# Patient Record
Sex: MALE | Race: WHITE | NOT HISPANIC OR LATINO | Employment: FULL TIME | URBAN - METROPOLITAN AREA
[De-identification: names, ages, dates, MRNs, and addresses within clinical notes are randomized per-mention and may not be internally consistent; named-entity substitution may affect disease eponyms.]

---

## 2017-03-31 ENCOUNTER — OFFICE VISIT (OUTPATIENT)
Dept: MEDICAL GROUP | Facility: PHYSICIAN GROUP | Age: 52
End: 2017-03-31
Payer: COMMERCIAL

## 2017-03-31 VITALS
OXYGEN SATURATION: 94 % | WEIGHT: 289 LBS | RESPIRATION RATE: 16 BRPM | DIASTOLIC BLOOD PRESSURE: 78 MMHG | TEMPERATURE: 96.8 F | HEART RATE: 77 BPM | HEIGHT: 76 IN | BODY MASS INDEX: 35.19 KG/M2 | SYSTOLIC BLOOD PRESSURE: 110 MMHG

## 2017-03-31 DIAGNOSIS — E66.9 OBESITY (BMI 30-39.9): ICD-10-CM

## 2017-03-31 DIAGNOSIS — K42.9 UMBILICAL HERNIA WITHOUT OBSTRUCTION AND WITHOUT GANGRENE: ICD-10-CM

## 2017-03-31 DIAGNOSIS — Z76.89 ENCOUNTER TO ESTABLISH CARE: ICD-10-CM

## 2017-03-31 PROBLEM — E78.5 DYSLIPIDEMIA: Status: ACTIVE | Noted: 2017-03-31

## 2017-03-31 PROCEDURE — 99213 OFFICE O/P EST LOW 20 MIN: CPT | Performed by: NURSE PRACTITIONER

## 2017-03-31 NOTE — ASSESSMENT & PLAN NOTE
Established problem that persists. He has lack of motivation and has poor diet frequently. 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

## 2017-03-31 NOTE — PROGRESS NOTES
"Subjective:     Chief Complaint   Patient presents with   • New Patient     umbilical hernia       HPI  Chris Barr is a 52 y.o. male here today to establish care and discuss umbilical hernia. Believes the umbilical hernia started about 1.5 years ago. It is infrequently bothering him. He does a lot of heavy lifting at work and does not notice symptoms during this time, but feels discomfort on the weekends when he is at home relaxing. He believes this may be because he is distracted while at work. He has been no change in bowel habits or blood in stool. No N/V. No treatments tried. He has history of inguinal hernia with repair. Requesting referral to see Dr. Campos for surgical consultation.    Obesity  Established problem that persists. He has lack of motivation and has poor diet frequently. 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     Diagnoses of Encounter to establish care, Umbilical hernia without obstruction and without gangrene, and Obesity (BMI 30-39.9) were pertinent to this visit.    Allergies: Review of patient's allergies indicates no known allergies.  Current medicines (including changes today)  Current Outpatient Prescriptions   Medication Sig Dispense Refill   • omeprazole (PRILOSEC) 20 MG delayed-release capsule Take 1 Cap by mouth every day. 30 Cap 0     No current facility-administered medications for this visit.       He  has a past medical history of Colon polyps (2001, 9/09, 10/12); Spinal stenosis; Arthritis; Anxiety (9/19/2013); Neck pain (6/26/2015); and Dyslipidemia (3/31/2017). He also has no past medical history of Diabetes (CMS-[facility]) or Hypertension.    Health Maintenance: UTD    ROS  As stated in HPI      Objective:     Blood pressure 110/78, pulse 77, temperature 36 °C (96.8 °F), resp. rate 16, height 1.93 m (6' 4\"), weight 131.09 kg (289 lb), SpO2 94 %. Body mass index is 35.19 kg/(m^2).  Physical " Exam:  General: Alert, oriented, in no acute distress.  Eye contact is good, speech goal directed, affect calm  Abdomen: Soft, ND, non-tender. No hepatosplenomegaly. Umbilical hernia present, reproducible. Bowel sounds active.   Ext: no edema        Assessment and Plan:   The following treatment plan was discussed  1. Encounter to establish care     2. Umbilical hernia without obstruction and without gangrene  Recommended abd binder for comfort until surgical eval  ER warning signs discussed for strangulation/obstructed hernia   REFERRAL TO GENERAL SURGERY   3. Obesity (BMI 30-39.9)  Patient identified as having weight management issue.  Appropriate orders and counseling given.       Followup: Return in about 6 months (around 9/30/2017) for Annual. sooner should new symptoms or problems arise.

## 2017-06-19 ENCOUNTER — OFFICE VISIT (OUTPATIENT)
Dept: URGENT CARE | Facility: PHYSICIAN GROUP | Age: 52
End: 2017-06-19
Payer: COMMERCIAL

## 2017-06-19 VITALS
WEIGHT: 271 LBS | HEART RATE: 86 BPM | DIASTOLIC BLOOD PRESSURE: 80 MMHG | TEMPERATURE: 97.3 F | SYSTOLIC BLOOD PRESSURE: 110 MMHG | RESPIRATION RATE: 14 BRPM | BODY MASS INDEX: 33 KG/M2 | OXYGEN SATURATION: 96 %

## 2017-06-19 DIAGNOSIS — R11.0 NAUSEA: ICD-10-CM

## 2017-06-19 DIAGNOSIS — R82.90 CLOUDY URINE: ICD-10-CM

## 2017-06-19 DIAGNOSIS — R31.9 HEMATURIA: ICD-10-CM

## 2017-06-19 DIAGNOSIS — R52 BODY ACHES: ICD-10-CM

## 2017-06-19 PROCEDURE — 99214 OFFICE O/P EST MOD 30 MIN: CPT | Performed by: PHYSICIAN ASSISTANT

## 2017-06-19 RX ORDER — ONDANSETRON 4 MG/1
4 TABLET, ORALLY DISINTEGRATING ORAL EVERY 8 HOURS PRN
Qty: 20 TAB | Refills: 0 | Status: ON HOLD | OUTPATIENT
Start: 2017-06-19 | End: 2017-06-28

## 2017-06-19 RX ORDER — CIPROFLOXACIN 500 MG/1
500 TABLET, FILM COATED ORAL 2 TIMES DAILY
Qty: 14 TAB | Refills: 0 | Status: SHIPPED | OUTPATIENT
Start: 2017-06-19 | End: 2017-11-10

## 2017-06-19 NOTE — PROGRESS NOTES
Chief Complaint   Patient presents with   • Dysuria       HISTORY OF PRESENT ILLNESS: Patient is a 52 y.o. male who presents today for evaluation of cloudy urine over the last few days. Patient reports dark cloudy urine, bodyaches, and subjective fever. He denies flank pain, abdominal pain, dysuria, changes in his urinary frequency or urgency. He does report mild nausea without vomiting. He has no history of UTIs, kidney stones, and does not smoke. He has been drinking a lot of water and last took ibuprofen earlier this morning.    Patient Active Problem List    Diagnosis Date Noted   • Dyslipidemia 2017   • Epigastric pain 2016   • Obesity (BMI 30-39.9) 2016   • Neck pain 2015   • Migraine headache 2015   • Anxiety associated with depression 2014   • Lumbar spinal stenosis 10/23/2009   • Colon polyps        Allergies:Review of patient's allergies indicates no known allergies.    Current Outpatient Prescriptions Ordered in [facility]   Medication Sig Dispense Refill   • ciprofloxacin (CIPRO) 500 MG Tab Take 1 Tab by mouth 2 times a day. 14 Tab 0   • ondansetron (ZOFRAN ODT) 4 MG TABLET DISPERSIBLE Take 1 Tab by mouth every 8 hours as needed for Nausea/Vomiting. 20 Tab 0   • omeprazole (PRILOSEC) 20 MG delayed-release capsule Take 1 Cap by mouth every day. 30 Cap 0     No current Epic-ordered facility-administered medications on file.       Past Medical History   Diagnosis Date   • Colon polyps , , 10/12     Tubular Adenoma   • Spinal stenosis      L2-L3   • Arthritis    • Anxiety 2013   • Neck pain 2015   • Dyslipidemia 3/31/2017       Social History   Substance Use Topics   • Smoking status: Never Smoker    • Smokeless tobacco: Never Used   • Alcohol Use: Yes      Comment: once a month       Family Status   Relation Status Death Age   • Mother Alive    • Maternal Grandmother     • Paternal Grandfather     • Father Alive      dementia   • Maternal  Grandfather     • Paternal Grandmother     • Brother Alive    • Son       carbon dioxide    • Son Alive    • Daughter Alive      Family History   Problem Relation Age of Onset   • Cancer Mother      Colon at 42   • Cancer Maternal Grandmother      colon   • Heart Disease Paternal Grandfather    • Cancer Father      lymphoma   • Alcohol/Drug Father      alcohol   • Cancer Brother      colon       ROS:   Review of Systems   Constitutional: Negative for  weight loss and malaise/fatigue.   HENT: Negative for ear pain, nosebleeds, congestion, sore throat and neck pain.    Eyes: Negative for blurred vision.   Respiratory: Negative for cough, sputum production, shortness of breath and wheezing.    Cardiovascular: Negative for chest pain, palpitations, orthopnea and leg swelling.   Gastrointestinal: Negative for heartburn, vomiting and abdominal pain.   Genitourinary: Negative for dysuria, urgency and frequency.       Exam:  Blood pressure 110/80, pulse 86, temperature 36.3 °C (97.3 °F), resp. rate 14, weight 122.925 kg (271 lb), SpO2 96 %.  General: Normal appearing. No distress.  HEENT: Head is grossly normal.  Pulmonary: Clear to ausculation and percussion.  Normal effort. No rales, ronchi, or wheezing.   Cardiovascular: Regular rate and rhythm without murmur.  Back: No CVA tenderness noted.   Neurologic: Grossly nonfocal.  Skin: No obvious lesions.  Psych: Normal mood. Alert and oriented x3. Judgment and insight is normal.    UA: Large blood, otherwise negative    Assessment/Plan:  Drink plenty of fluids. Will contact patient with culture results. Patient will be out of town for the next 5 days and out of cell phone range. Given bodyaches, cloudy urine, no hematuria, we'll cover for potential urinary tract infection or kidney stone.  1. Hematuria  ciprofloxacin (CIPRO) 500 MG Tab   2. Cloudy urine  ciprofloxacin (CIPRO) 500 MG Tab   3. Body aches  ciprofloxacin (CIPRO) 500 MG Tab   4. Nausea   ondansetron (ZOFRAN ODT) 4 MG TABLET DISPERSIBLE

## 2017-06-19 NOTE — MR AVS SNAPSHOT
Chris Barr   2017 4:20 PM   Office Visit   MRN: [id number]    Department:  [facility]   Dept Phone:  [phone]    Description:  Male : 1965   Provider:  Agnes Mcbride PA-C           Reason for Visit     Dysuria           Allergies as of 2017     No Known Allergies      You were diagnosed with     Hematuria   [1502770]       Cloudy urine   [425340]       Body aches   [361408]       Nausea   [244262]         Vital Signs     Blood Pressure Pulse Temperature Respirations Weight Oxygen Saturation    110/80 mmHg 86 36.3 °C (97.3 °F) 14 122.925 kg (271 lb) 96%    Smoking Status                   Never Smoker            Basic Information     Date Of Birth Sex Race Ethnicity Preferred Language    1965 Male White Non- English      Your appointments     2017  7:15 AM   Scheduled Pre Admission with PREADMIT 2   PREADMIT TESTS [facility] (--)    [address] [place] [id number]   [phone]              Problem List              ICD-10-CM Priority Class Noted - Resolved    Colon polyps K63.5   Unknown - Present    Lumbar spinal stenosis M48.06   10/23/2009 - Present    Anxiety associated with depression F41.8   2014 - Present    Migraine headache G43.909   3/5/2015 - Present    Neck pain M54.2   2015 - Present    Epigastric pain R10.13   2016 - Present    Obesity (BMI 30-39.9) E66.9   2016 - Present    Dyslipidemia E78.5   3/31/2017 - Present      Health Maintenance        Date Due Completion Dates    COLONOSCOPY 10/1/2017 10/1/2012 (Done)    Override on 10/1/2012: Done    IMM DTaP/Tdap/Td Vaccine (3 - Td) 2026, 2014, 5/10/2006            Current Immunizations     Influenza TIV (IM) 10/14/2013, 10/8/2012    Influenza Vaccine Quad Inj (Pf) 10/3/2016  9:14 AM, 10/5/2015  8:47 AM, 10/13/2014    TD Vaccine 5/10/2006    Tdap Vaccine 2016, 2014    Tuberculin Skin Test 2016, 2016, 2013  6:55 AM, 2012  7:25 AM,  7/20/2011      Below and/or attached are the medications your provider expects you to take. Review all of your home medications and newly ordered medications with your provider and/or pharmacist. Follow medication instructions as directed by your provider and/or pharmacist. Please keep your medication list with you and share with your provider. Update the information when medications are discontinued, doses are changed, or new medications (including over-the-counter products) are added; and carry medication information at all times in the event of emergency situations     Allergies:  No Known Allergies          Medications  Valid as of: June 19, 2017 -  6:52 PM    Generic Name Brand Name Tablet Size Instructions for use    Ciprofloxacin HCl (Tab) CIPRO 500 MG Take 1 Tab by mouth 2 times a day.        Omeprazole (CAPSULE DELAYED RELEASE) PRILOSEC 20 MG Take 1 Cap by mouth every day.        Ondansetron (TABLET DISPERSIBLE) ZOFRAN ODT 4 MG Take 1 Tab by mouth every 8 hours as needed for Nausea/Vomiting.        .                 Medicines prescribed today were sent to:     [facility] [address] [place] [id number]    Phone: [phone] Fax: [phone]    Open 24 Hours?: No      Medication refill instructions:       If your prescription bottle indicates you have medication refills left, it is not necessary to call your provider’s office. Please contact your pharmacy and they will refill your medication.    If your prescription bottle indicates you do not have any refills left, you may request refills at any time through one of the following ways: The online Active Endpoints system (except Urgent Care), by calling your provider’s office, or by asking your pharmacy to contact your provider’s office with a refill request. Medication refills are processed only during regular business hours and may not be available until the next business day. Your provider may  request additional information or to have a follow-up visit with you prior to refilling your medication.   *Please Note: Medication refills are assigned a new Rx number when refilled electronically. Your pharmacy may indicate that no refills were authorized even though a new prescription for the same medication is available at the pharmacy. Please request the medicine by name with the pharmacy before contacting your provider for a refill.           [employer] Access Code: Activation code not generated  Current YouBeQBt Status: Active

## 2017-06-26 ENCOUNTER — APPOINTMENT (OUTPATIENT)
Dept: ADMISSIONS | Facility: MEDICAL CENTER | Age: 52
End: 2017-06-26
Payer: COMMERCIAL

## 2017-06-26 ENCOUNTER — APPOINTMENT (OUTPATIENT)
Dept: ADMISSIONS | Facility: MEDICAL CENTER | Age: 52
End: 2017-06-26
Attending: INTERNAL MEDICINE
Payer: COMMERCIAL

## 2017-06-28 ENCOUNTER — HOSPITAL ENCOUNTER (OUTPATIENT)
Facility: MEDICAL CENTER | Age: 52
End: 2017-06-28
Attending: SURGERY | Admitting: SURGERY
Payer: COMMERCIAL

## 2017-06-28 VITALS
WEIGHT: 276.68 LBS | HEIGHT: 76 IN | SYSTOLIC BLOOD PRESSURE: 131 MMHG | OXYGEN SATURATION: 89 % | DIASTOLIC BLOOD PRESSURE: 81 MMHG | BODY MASS INDEX: 33.69 KG/M2 | RESPIRATION RATE: 18 BRPM | TEMPERATURE: 97.7 F | HEART RATE: 79 BPM

## 2017-06-28 PROBLEM — K42.9 UMBILICAL HERNIA: Status: ACTIVE | Noted: 2017-06-28

## 2017-06-28 PROCEDURE — 160027 HCHG SURGERY MINUTES - 1ST 30 MINS LEVEL 2: Performed by: SURGERY

## 2017-06-28 PROCEDURE — 160038 HCHG SURGERY MINUTES - EA ADDL 1 MIN LEVEL 2: Performed by: SURGERY

## 2017-06-28 PROCEDURE — 700102 HCHG RX REV CODE 250 W/ 637 OVERRIDE(OP)

## 2017-06-28 PROCEDURE — 160047 HCHG PACU  - EA ADDL 30 MINS PHASE II: Performed by: SURGERY

## 2017-06-28 PROCEDURE — 160036 HCHG PACU - EA ADDL 30 MINS PHASE I: Performed by: SURGERY

## 2017-06-28 PROCEDURE — 160048 HCHG OR STATISTICAL LEVEL 1-5: Performed by: SURGERY

## 2017-06-28 PROCEDURE — 160046 HCHG PACU - 1ST 60 MINS PHASE II: Performed by: SURGERY

## 2017-06-28 PROCEDURE — 160025 RECOVERY II MINUTES (STATS): Performed by: SURGERY

## 2017-06-28 PROCEDURE — 700111 HCHG RX REV CODE 636 W/ 250 OVERRIDE (IP)

## 2017-06-28 PROCEDURE — A9270 NON-COVERED ITEM OR SERVICE: HCPCS

## 2017-06-28 PROCEDURE — 501838 HCHG SUTURE GENERAL: Performed by: SURGERY

## 2017-06-28 PROCEDURE — C1781 MESH (IMPLANTABLE): HCPCS | Performed by: SURGERY

## 2017-06-28 PROCEDURE — 700101 HCHG RX REV CODE 250

## 2017-06-28 PROCEDURE — 160002 HCHG RECOVERY MINUTES (STAT): Performed by: SURGERY

## 2017-06-28 PROCEDURE — 160035 HCHG PACU - 1ST 60 MINS PHASE I: Performed by: SURGERY

## 2017-06-28 PROCEDURE — 160009 HCHG ANES TIME/MIN: Performed by: SURGERY

## 2017-06-28 PROCEDURE — A6402 STERILE GAUZE <= 16 SQ IN: HCPCS | Performed by: SURGERY

## 2017-06-28 DEVICE — MESH VENTRALEX ST W/STRAP - 4.3CM SMALL (1EA/CA): Type: IMPLANTABLE DEVICE | Status: FUNCTIONAL

## 2017-06-28 RX ORDER — SCOLOPAMINE TRANSDERMAL SYSTEM 1 MG/1
1 PATCH, EXTENDED RELEASE TRANSDERMAL
Status: DISCONTINUED | OUTPATIENT
Start: 2017-06-28 | End: 2017-06-28 | Stop reason: HOSPADM

## 2017-06-28 RX ORDER — DEXAMETHASONE SODIUM PHOSPHATE 4 MG/ML
4 INJECTION, SOLUTION INTRA-ARTICULAR; INTRALESIONAL; INTRAMUSCULAR; INTRAVENOUS; SOFT TISSUE
Status: DISCONTINUED | OUTPATIENT
Start: 2017-06-28 | End: 2017-06-28 | Stop reason: HOSPADM

## 2017-06-28 RX ORDER — ACETAMINOPHEN 500 MG
TABLET ORAL
Status: COMPLETED
Start: 2017-06-28 | End: 2017-06-28

## 2017-06-28 RX ORDER — KETOROLAC TROMETHAMINE 30 MG/ML
30 INJECTION, SOLUTION INTRAMUSCULAR; INTRAVENOUS EVERY 6 HOURS
Status: DISCONTINUED | OUTPATIENT
Start: 2017-06-28 | End: 2017-06-28 | Stop reason: HOSPADM

## 2017-06-28 RX ORDER — BUPIVACAINE HYDROCHLORIDE AND EPINEPHRINE 5; 5 MG/ML; UG/ML
INJECTION, SOLUTION EPIDURAL; INTRACAUDAL; PERINEURAL
Status: DISCONTINUED | OUTPATIENT
Start: 2017-06-28 | End: 2017-06-28 | Stop reason: HOSPADM

## 2017-06-28 RX ORDER — SODIUM CHLORIDE, SODIUM LACTATE, POTASSIUM CHLORIDE, CALCIUM CHLORIDE 600; 310; 30; 20 MG/100ML; MG/100ML; MG/100ML; MG/100ML
INJECTION, SOLUTION INTRAVENOUS CONTINUOUS
Status: DISCONTINUED | OUTPATIENT
Start: 2017-06-28 | End: 2017-06-28 | Stop reason: HOSPADM

## 2017-06-28 RX ORDER — HYDROCODONE BITARTRATE AND ACETAMINOPHEN 5; 325 MG/1; MG/1
1-2 TABLET ORAL EVERY 4 HOURS PRN
Status: DISCONTINUED | OUTPATIENT
Start: 2017-06-28 | End: 2017-06-28 | Stop reason: HOSPADM

## 2017-06-28 RX ORDER — OXYCODONE HCL 5 MG/5 ML
SOLUTION, ORAL ORAL
Status: COMPLETED
Start: 2017-06-28 | End: 2017-06-28

## 2017-06-28 RX ORDER — HALOPERIDOL 5 MG/ML
1 INJECTION INTRAMUSCULAR EVERY 6 HOURS PRN
Status: DISCONTINUED | OUTPATIENT
Start: 2017-06-28 | End: 2017-06-28 | Stop reason: HOSPADM

## 2017-06-28 RX ORDER — DIPHENHYDRAMINE HYDROCHLORIDE 50 MG/ML
25 INJECTION INTRAMUSCULAR; INTRAVENOUS EVERY 6 HOURS PRN
Status: DISCONTINUED | OUTPATIENT
Start: 2017-06-28 | End: 2017-06-28 | Stop reason: HOSPADM

## 2017-06-28 RX ORDER — ONDANSETRON 2 MG/ML
4 INJECTION INTRAMUSCULAR; INTRAVENOUS EVERY 4 HOURS PRN
Status: DISCONTINUED | OUTPATIENT
Start: 2017-06-28 | End: 2017-06-28 | Stop reason: HOSPADM

## 2017-06-28 RX ADMIN — OXYCODONE HYDROCHLORIDE 10 MG: 5 SOLUTION ORAL at 10:13

## 2017-06-28 RX ADMIN — FENTANYL CITRATE 25 MCG: 50 INJECTION, SOLUTION INTRAMUSCULAR; INTRAVENOUS at 10:42

## 2017-06-28 RX ADMIN — ACETAMINOPHEN 1000 MG: 500 TABLET, FILM COATED ORAL at 10:14

## 2017-06-28 RX ADMIN — SODIUM CHLORIDE, SODIUM LACTATE, POTASSIUM CHLORIDE, CALCIUM CHLORIDE 1000 ML: 600; 310; 30; 20 INJECTION, SOLUTION INTRAVENOUS at 08:15

## 2017-06-28 RX ADMIN — FENTANYL CITRATE 25 MCG: 50 INJECTION, SOLUTION INTRAMUSCULAR; INTRAVENOUS at 10:47

## 2017-06-28 ASSESSMENT — PAIN SCALES - GENERAL
PAINLEVEL_OUTOF10: 3
PAINLEVEL_OUTOF10: 0
PAINLEVEL_OUTOF10: 2
PAINLEVEL_OUTOF10: 0
PAINLEVEL_OUTOF10: 2

## 2017-06-28 NOTE — IP AVS SNAPSHOT
" Home Care Instructions                                                                                                                Name:Chris Barr  Medical Record Number:[id number]  CSN: [id number]    YOB: 1965   Age: 52 y.o.  Sex: male  HT:1.93 m (6' 4\") WT: 125.5 kg (276 lb 10.8 oz)          Admit Date: 6/28/2017     Discharge Date:   Today's Date: 6/28/2017  Attending Doctor:  Juan Campos M.D.                  Allergies:  Review of patient's allergies indicates no known allergies.                Discharge Instructions         ACTIVITY: Rest and take it easy for the first 24 hours.  A responsible adult is recommended to remain with you during that time.  It is normal to feel sleepy.  We encourage you to not do anything that requires balance, judgment or coordination.    MILD FLU-LIKE SYMPTOMS ARE NORMAL. YOU MAY EXPERIENCE GENERALIZED MUSCLE ACHES, THROAT IRRITATION, HEADACHE AND/OR SOME NAUSEA.    FOR 24 HOURS DO NOT:  Drive, operate machinery or run household appliances.  Drink beer or alcoholic beverages.   Make important decisions or sign legal documents.    SPECIAL INSTRUCTIONS:    No lifting > 20 lbs x 4 weeks.   Ice pack to incisions intermittently to reduce swelling and pain         DIET: To avoid nausea, slowly advance diet as tolerated, avoiding spicy or greasy foods for the first day.  Add more substantial food to your diet according to your physician's instructions.  Babies can be fed formula or breast milk as soon as they are hungry.  INCREASE FLUIDS AND FIBER TO AVOID CONSTIPATION.    SURGICAL DRESSING/BATHING:         Patient to remove dressing in 48 - 72 hrs.        OK to shower when dressing removed / no bath x 2 weeks.    FOLLOW-UP APPOINTMENT:  A follow-up appointment should be arranged with your doctor in 10-14 days; call to schedule.    You should CALL YOUR PHYSICIAN if you develop:  Fever greater than 101 degrees F.  Pain not relieved by medication, or persistent " nausea or vomiting.  Excessive bleeding (blood soaking through dressing) or unexpected drainage from the wound.  Extreme redness or swelling around the incision site, drainage of pus or foul smelling drainage.  Inability to urinate or empty your bladder within 8 hours.  Problems with breathing or chest pain.    You should call 911 if you develop problems with breathing or chest pain.  If you are unable to contact your doctor or surgical center, you should go to the nearest emergency room or urgent care center.  Physician's telephone #: *[phone]**    If any questions arise, call your doctor.  If your doctor is not available, please feel free to call the Surgical Center at {Surgical Dept Numbers:[id number]}.  The Center is open Monday through Friday from 7AM to 7PM.  You can also call the [facility] HOTLINE open 24 hours/day, 7 days/week and speak to a nurse at [phone], or toll free at [phone].    A registered nurse may call you a few days after your surgery to see how you are doing after your procedure.    MEDICATIONS: Resume taking daily medication.  Take prescribed pain medication with food.  If no medication is prescribed, you may take non-aspirin pain medication if needed.  PAIN MEDICATION CAN BE VERY CONSTIPATING.  Take a stool softener or laxative such as senokot, pericolace, or milk of magnesia if needed.    Prescription given for Norco.  Last pain medication given at 1173.    If your physician has prescribed pain medication that includes Acetaminophen (Tylenol), do not take additional Acetaminophen (Tylenol) while taking the prescribed medication.    Depression / Suicide Risk    As you are discharged from this [facility] facility, it is important to learn how to keep safe from harming yourself.    Recognize the warning signs:  · Abrupt changes in personality, positive or negative- including increase in energy   · Giving away possessions  · Change in eating patterns- significant weight changes-   positive or negative  · Change in sleeping patterns- unable to sleep or sleeping all the time   · Unwillingness or inability to communicate  · Depression  · Unusual sadness, discouragement and loneliness  · Talk of wanting to die  · Neglect of personal appearance   · Rebelliousness- reckless behavior  · Withdrawal from people/activities they love  · Confusion- inability to concentrate     If you or a loved one observes any of these behaviors or has concerns about self-harm, here's what you can do:  · Talk about it- your feelings and reasons for harming yourself  · Remove any means that you might use to hurt yourself (examples: pills, rope, extension cords, firearm)  · Get professional help from the community (Mental Health, Substance Abuse, psychological counseling)  · Do not be alone:Call your Safe Contact- someone whom you trust who will be there for you.  · Call your local CRISIS HOTLINE 991-5475 or 114-816-3684  · Call your local Children's Mobile Crisis Response Team [place] (847) 302-3462 or www.[employer]  · Call the toll free National Suicide Prevention Hotlines   · National Suicide Prevention Lifeline 058-880-XJDP (0429)  · National Hope Line Network 800-SUICIDE (428-8529)       Medication List      CONTINUE taking these medications        Instructions    Morning Afternoon Evening Bedtime    ciprofloxacin 500 MG Tabs   Commonly known as:  CIPRO        Take 1 Tab by mouth 2 times a day.   Dose:  500 mg                                Medication Information     Above and/or attached are the medications your physician expects you to take upon discharge. Review all of your home medications and newly ordered medications with your doctor and/or pharmacist. Follow medication instructions as directed by your doctor and/or pharmacist. Please keep your medication list with you and share with your physician. Update the information when medications are discontinued, doses are changed, or new medications  (including over-the-counter products) are added; and carry medication information at all times in the event of emergency situations.        Resources     Quit Smoking / Tobacco Use:    I understand the use of any tobacco products increases my chance of suffering from future heart disease or stroke and could cause other illnesses which may shorten my life. Quitting the use of tobacco products is the single most important thing I can do to improve my health. For further information on smoking / tobacco cessation call a Toll Free Quit Line at 1-327.538.9876 (*[employer]) or 1-543.176.1965 (American Lung Association) or you can access the web based program at www.lungusa.org.    [place] Tobacco Users Help Line:  (497) 921-7648       Toll Free: 1-733.326.4073  Quit Tobacco Program [facility] (684)583-8527    Crisis Hotline:    [place] Crisis Hotline:  1-213-EDELKGQ or 1-506.830.8970    [place] Crisis Hotline:    1-333.409.7826 or 499-760-4427    Discharge Survey:   Thank you for choosing [facility]. We hope we did everything we could to make your hospital stay a pleasant one. You may be receiving a survey and we would appreciate your time and participation in answering the questions. Your input is very valuable to us in our efforts to improve our service to our patients and their families.            Signatures     My signature on this form indicates that:    1. I acknowledge receipt and understanding of these Home Care Instruction.  2. My questions regarding this information have been answered to my satisfaction.  3. I have formulated a plan with my discharge nurse to obtain my prescribed medications for home.    __________________________________      __________________________________                   Patient Signature                                 Guardian/Responsible Adult Signature      __________________________________                 __________       ________                        Nurse Signature                                               Date                 Time

## 2017-06-28 NOTE — IP AVS SNAPSHOT
6/28/2017    Chris Barr  [address] [place] [address] [place] [address]    Dear Chris:    [facility] wants to ensure your discharge home is safe and you or your loved ones have had all of your questions answered regarding your care after you leave the hospital.    Below is a list of resources and contact information should you have any questions regarding your hospital stay, follow-up instructions, or active medical symptoms.    Questions or Concerns Regarding… Contact   Medical Questions Related to Your Discharge  (7 days a week, 8am-5pm) Contact a Nurse Care Coordinator   [phone]   Medical Questions Not Related to Your Discharge  (24 hours a day / 7 days a week)  Contact the Nurse Health Line   [phone]    Medications or Discharge Instructions Refer to your discharge packet   or contact your [facility] Primary Care Provider   [phone]   Follow-up Appointment(s) Schedule your appointment via SSEV   or contact Scheduling [phone]   Billing Review your statement via SSEV  or contact Billing [phone]   Medical Records Review your records via SSEV   or contact Medical Records [phone]     You may receive a telephone call within two days of discharge. This call is to make certain you understand your discharge instructions and have the opportunity to have any questions answered. You can also easily access your medical information, test results and upcoming appointments via the SSEV free online health management tool. You can learn more and sign up at [email or web address]. For assistance setting up your SSEV account, please call [phone].    Once again, we want to ensure your discharge home is safe and that you have a clear understanding of any next steps in your care. If you have any questions or concerns, please do not hesitate to contact us, we are here for you. Thank you for choosing [facility] for your healthcare needs.    Sincerely,    Your [facility] Healthcare Team

## 2017-06-28 NOTE — DISCHARGE INSTRUCTIONS
ACTIVITY: Rest and take it easy for the first 24 hours.  A responsible adult is recommended to remain with you during that time.  It is normal to feel sleepy.  We encourage you to not do anything that requires balance, judgment or coordination.    MILD FLU-LIKE SYMPTOMS ARE NORMAL. YOU MAY EXPERIENCE GENERALIZED MUSCLE ACHES, THROAT IRRITATION, HEADACHE AND/OR SOME NAUSEA.    FOR 24 HOURS DO NOT:  Drive, operate machinery or run household appliances.  Drink beer or alcoholic beverages.   Make important decisions or sign legal documents.    SPECIAL INSTRUCTIONS:    No lifting > 20 lbs x 4 weeks.   Ice pack to incisions intermittently to reduce swelling and pain         DIET: To avoid nausea, slowly advance diet as tolerated, avoiding spicy or greasy foods for the first day.  Add more substantial food to your diet according to your physician's instructions.  Babies can be fed formula or breast milk as soon as they are hungry.  INCREASE FLUIDS AND FIBER TO AVOID CONSTIPATION.    SURGICAL DRESSING/BATHING:         Patient to remove dressing in 48 - 72 hrs.        OK to shower when dressing removed / no bath x 2 weeks.    FOLLOW-UP APPOINTMENT:  A follow-up appointment should be arranged with your doctor in 10-14 days; call to schedule.    You should CALL YOUR PHYSICIAN if you develop:  Fever greater than 101 degrees F.  Pain not relieved by medication, or persistent nausea or vomiting.  Excessive bleeding (blood soaking through dressing) or unexpected drainage from the wound.  Extreme redness or swelling around the incision site, drainage of pus or foul smelling drainage.  Inability to urinate or empty your bladder within 8 hours.  Problems with breathing or chest pain.    You should call 911 if you develop problems with breathing or chest pain.  If you are unable to contact your doctor or surgical center, you should go to the nearest emergency room or urgent care center.  Physician's telephone #:  *[phone]**    If any questions arise, call your doctor.  If your doctor is not available, please feel free to call the Surgical Center at {Surgical Dept Numbers:[id number]}.  The Center is open Monday through Friday from 7AM to 7PM.  You can also call the HEALTH HOTLINE open 24 hours/day, 7 days/week and speak to a nurse at [phone], or toll free at [phone].    A registered nurse may call you a few days after your surgery to see how you are doing after your procedure.    MEDICATIONS: Resume taking daily medication.  Take prescribed pain medication with food.  If no medication is prescribed, you may take non-aspirin pain medication if needed.  PAIN MEDICATION CAN BE VERY CONSTIPATING.  Take a stool softener or laxative such as senokot, pericolace, or milk of magnesia if needed.    Prescription given for Norco.  Last pain medication given at 7253.    If your physician has prescribed pain medication that includes Acetaminophen (Tylenol), do not take additional Acetaminophen (Tylenol) while taking the prescribed medication.    Depression / Suicide Risk    As you are discharged from this [facility] facility, it is important to learn how to keep safe from harming yourself.    Recognize the warning signs:  · Abrupt changes in personality, positive or negative- including increase in energy   · Giving away possessions  · Change in eating patterns- significant weight changes-  positive or negative  · Change in sleeping patterns- unable to sleep or sleeping all the time   · Unwillingness or inability to communicate  · Depression  · Unusual sadness, discouragement and loneliness  · Talk of wanting to die  · Neglect of personal appearance   · Rebelliousness- reckless behavior  · Withdrawal from people/activities they love  · Confusion- inability to concentrate     If you or a loved one observes any of these behaviors or has concerns about self-harm, here's what you can do:  · Talk about it- your feelings and  reasons for harming yourself  · Remove any means that you might use to hurt yourself (examples: pills, rope, extension cords, firearm)  · Get professional help from the community (Mental Health, Substance Abuse, psychological counseling)  · Do not be alone:Call your Safe Contact- someone whom you trust who will be there for you.  · Call your local CRISIS HOTLINE 903-4631 or 727-495-0282  · Call your local Children's Mobile Crisis Response Team [place] (232) 647-1462 or www.[employer]  · Call the toll free National Suicide Prevention Hotlines   · National Suicide Prevention Lifeline 488-685-GKSG (1661)  · National Hope Line Network 800-SUICIDE (506-4478)

## 2017-06-28 NOTE — OR SURGEON
Immediate Post-Operative Note      PreOp Diagnosis: Umbilical hernia    PostOp Diagnosis: same    Procedure(s):  UMBILICAL HERNIA REPAIR WITH MESH - Wound Class: Clean    Surgeon(s):  Juan Campos M.D.    Anesthesiologist/Type of Anesthesia:  Anesthesiologist: Brandon Lima M.D./General    Surgical Staff:  Circulator: Nishi Keita R.N.  Scrub Person: Nishi Lopez    Specimen: none    Estimated Blood Loss: < 5 cc    Findings: defect ~1.8 cm / Ventralux ST small mesh placed    Complications: none        6/28/2017 10:15 AM Juan Campos

## 2017-06-29 NOTE — OP REPORT
Operative Note      PreOp Diagnosis: Umbilical hernia    PostOp Diagnosis: same    Procedure(s):  UMBILICAL HERNIA REPAIR WITH MESH - Wound Class: Clean    Surgeon(s):  Juan Campos M.D.    Anesthesiologist/Type of Anesthesia:  Anesthesiologist: Brandon Lima M.D./General    Surgical Staff:  Circulator: Nishi Keita R.N.  Scrub Person: Nishi Lopez    Specimen: none    Estimated Blood Loss: < 5 cc    Findings: defect ~1.8 cm / Ventralux ST small mesh placed    Complications: none    Description: The patient placed in supine position. General anesthesia was administered. His abdomen was shaved and then prepped with ChloraPrep.  The area is widely draped.  Cc Mr. 10 mL initially as a subcutaneous injections are planned incision as well as a left and right lateral. Skin incision was made with a scalpel cause uses subcu subcu tissues to the level of fascia. The hernia sac was  from the overlying skin with cautery. The sac was dissected from the fascia in all directions using, she'll blunted as well as cautery because used in tissues for approximately a 1 cm in every direction. The sac was easily reduced. There is no inserted into the preperitoneal space in separate the tissue in all directions. A small Ventralux ST mesh was inserted preperitoneal space and secured with 0 Ethibond sutures ×6. The remaining defect was closed with a #1 Vicryl running suture. The umbilicus reattached to the fascia through 0 Vicryl sutures. The remaining local anesthesia was infiltrated into the skin subcutaneous tissues.  Subcutaneous tissues reapproximated interrupted 3-0 Vicryl sutures and skin was approximate the running 4-0 Vicryl subcuticular suture. There is a clean dried and Steri-Strips applied followed by gauze and Tegaderm.  Patient stops is well. Was extubated and brought to recovery with the plan to be discharged home.    6/28/2017 10:15 AM Juan Campos

## 2017-09-05 ENCOUNTER — TELEPHONE (OUTPATIENT)
Dept: MEDICAL GROUP | Facility: PHYSICIAN GROUP | Age: 52
End: 2017-09-05

## 2017-09-05 DIAGNOSIS — K63.5 POLYP OF COLON, UNSPECIFIED PART OF COLON, UNSPECIFIED TYPE: ICD-10-CM

## 2017-09-05 DIAGNOSIS — Z12.11 SCREEN FOR COLON CANCER: ICD-10-CM

## 2017-09-05 NOTE — TELEPHONE ENCOUNTER
Pt is needing a 5 yr follow up for colonoscopy.  He had colon polyps in 2012.  Please place referral for [facility].

## 2017-09-15 ENCOUNTER — EH NON-PROVIDER (OUTPATIENT)
Dept: OCCUPATIONAL MEDICINE | Facility: CLINIC | Age: 52
End: 2017-09-15

## 2017-09-15 DIAGNOSIS — Z29.89 NEED FOR ISOLATION: ICD-10-CM

## 2017-09-15 PROCEDURE — 94375 RESPIRATORY FLOW VOLUME LOOP: CPT

## 2017-09-23 ENCOUNTER — HOSPITAL ENCOUNTER (OUTPATIENT)
Dept: LAB | Facility: MEDICAL CENTER | Age: 52
End: 2017-09-23
Payer: COMMERCIAL

## 2017-09-23 LAB
BDY FAT % MEASURED: 30.1 %
BP DIAS: 76 MMHG
BP SYS: 110 MMHG
CHOLEST SERPL-MCNC: 133 MG/DL (ref 100–199)
DIABETES HTDIA: NO
EVENT NAME HTEVT: NORMAL
FASTING HTFAS: YES
GLUCOSE SERPL-MCNC: 98 MG/DL (ref 65–99)
HDLC SERPL-MCNC: 28 MG/DL
HYPERTENSION HTHYP: NO
LDLC SERPL CALC-MCNC: 78 MG/DL
SCREENING LOC CITY HTCIT: NORMAL
SCREENING LOC STATE HTSTA: NORMAL
SCREENING LOCATION HTLOC: NORMAL
SMOKING HTSMO: NO
SUBSCRIBER ID HTSID: NORMAL
TRIGL SERPL-MCNC: 135 MG/DL (ref 0–149)

## 2017-09-23 PROCEDURE — 82947 ASSAY GLUCOSE BLOOD QUANT: CPT

## 2017-09-23 PROCEDURE — 36415 COLL VENOUS BLD VENIPUNCTURE: CPT

## 2017-09-23 PROCEDURE — S5190 WELLNESS ASSESSMENT BY NONPH: HCPCS

## 2017-09-23 PROCEDURE — 80061 LIPID PANEL: CPT

## 2017-10-10 ENCOUNTER — IMMUNIZATION (OUTPATIENT)
Dept: OCCUPATIONAL MEDICINE | Facility: CLINIC | Age: 52
End: 2017-10-10

## 2017-10-10 DIAGNOSIS — Z23 NEED FOR VACCINATION: ICD-10-CM

## 2017-10-10 PROCEDURE — 90686 IIV4 VACC NO PRSV 0.5 ML IM: CPT | Performed by: PREVENTIVE MEDICINE

## 2017-11-10 ENCOUNTER — OFFICE VISIT (OUTPATIENT)
Dept: MEDICAL GROUP | Facility: PHYSICIAN GROUP | Age: 52
End: 2017-11-10
Payer: COMMERCIAL

## 2017-11-10 VITALS
DIASTOLIC BLOOD PRESSURE: 76 MMHG | HEART RATE: 80 BPM | HEIGHT: 76 IN | SYSTOLIC BLOOD PRESSURE: 110 MMHG | WEIGHT: 292 LBS | BODY MASS INDEX: 35.56 KG/M2 | OXYGEN SATURATION: 96 % | RESPIRATION RATE: 16 BRPM | TEMPERATURE: 97.8 F

## 2017-11-10 DIAGNOSIS — G89.29 CHRONIC NECK PAIN: ICD-10-CM

## 2017-11-10 DIAGNOSIS — M54.50 CHRONIC LEFT-SIDED LOW BACK PAIN WITHOUT SCIATICA: ICD-10-CM

## 2017-11-10 DIAGNOSIS — F41.8 ANXIETY ASSOCIATED WITH DEPRESSION: ICD-10-CM

## 2017-11-10 DIAGNOSIS — M54.2 CHRONIC NECK PAIN: ICD-10-CM

## 2017-11-10 DIAGNOSIS — G89.29 CHRONIC LEFT-SIDED LOW BACK PAIN WITHOUT SCIATICA: ICD-10-CM

## 2017-11-10 PROCEDURE — 99213 OFFICE O/P EST LOW 20 MIN: CPT | Performed by: NURSE PRACTITIONER

## 2017-11-10 RX ORDER — CLONAZEPAM 1 MG/1
0.5 TABLET ORAL 2 TIMES DAILY PRN
Qty: 30 TAB | Refills: 0 | Status: SHIPPED
Start: 2017-11-10 | End: 2019-10-08

## 2017-11-10 ASSESSMENT — PATIENT HEALTH QUESTIONNAIRE - PHQ9: CLINICAL INTERPRETATION OF PHQ2 SCORE: 0

## 2017-11-10 NOTE — PROGRESS NOTES
Subjective:     Chief Complaint   Patient presents with   • Back Pain     ongoing low back pain   • Neck Pain     ongoing neck pain       HPI  Chris Barr is a 52 y.o. male here today for referral request and med refill    Chronic neck pain  Ongoing chronic problem slightly worsened over the past month. He has chronic pain in neck. There is no radiculopathy pain. No arm numbness, tingling, or weakness. Has hx of ablations in the past that worked well. Would like to discuss this again.   5/28/2014 8:33 AM    HISTORY/REASON FOR EXAM:  Neck pain, chronic. Increasing over last month.      TECHNIQUE/EXAM DESCRIPTION:  MRI of the cervical spine without contrast.    The study was performed on a [employer] Signa 1.5 Navya MRI scanner.  T1 sagittal, T2 fast spin-echo sagittal, and gradient-echo axial images were obtained of the cervical spine.    COMPARISON: None.    FINDINGS:    The cervical vertebral bodies have a normal  alignment.  There is moderate disc space narrowing C5-C6, mild at C6-C7 and C7-T1. The cervical cord has a normal caliber course and appearance.    Findings specific to each level are described below:    C2-3: No significant central canal or neural foraminal stenosis.  C3-4:  There is moderate right and mild left neural foraminal stenosis secondary to disc bulge and facet arthropathy.  C4-5:  There is severe left and mild-to-moderate right neural foraminal stenosis secondary to disc bulge and facet arthropathy.  C5-6: There is mild broad posterior disc bulge. No significant central canal or neural foraminal stenosis.  C6-7: There is mild focal posterior midline disc protrusion. The disk shows posterior midline linear T2 hyperintensity consistent with an annular defect or annular tear. No significant central canal or neural foraminal stenosis.  C7-T1: No significant central canal or neural foraminal stenosis.    The visualized prevertebral and posterior paraspinous soft tissues are grossly unremarkable.    Impression         1.  C6-C7 disc demonstrates a posterior midline linear T2 signal hyperintensity consistent with annular defect or tear.  2.  Multilevel degenerative changes of spine as described above.       Lumbar disc disease  Ongoing chronic problem. Slightly worsened over this past month. States pain low back localized without any radicular pain. Pain mostly on left low back. Not radiating. Has had ablations in the past and epidural once. Last ablation about 2 years ago. He would like to see physiatrist for discussion about non-surgical procedural options for pain.      HISTORY/REASON FOR EXAM:  Low Back Pain.      TECHNIQUE/EXAM DESCRIPTION:  MRI of the lumbar spine without contrast, 10/11/2013 7:43 AM.    The study was performed on a [employer] Signa 1.5 Navya MRI scanner.  T1 sagittal, T2 fast spin-echo sagittal, and T2 axial images were obtained of the lumbar spine.    COMPARISON:  12/1/07    FINDINGS:  The lumbar spine maintains normal alignment.There is no fracture or dislocation.  There are Schmorl's nodes  at the inferior endplates of the L1 and T12   There are  chronic compression deformities at T12 on L1.    At the level of L5-S1, there is diffuse disk bulge and facet joint arthropathy.  There is mild left neural foraminal stenosis.  There is no central canal stenosis.    At the level of L4-5, there is diffuse disk bulge and facet joint arthropathy.  There is mild bilateral neural foraminal stenosis.  There is no central canal stenosis.    At the level of L3-4, there is mild disk bulge and facet joint arthropathy.  There is mild bilateral neural foraminal stenosis.  There is no central canal stenosis.    At the level of the L2-3, there is mild disk bulge and facet joint arthropathy.  There is mild to moderate central canal and mild neural foraminal stenosis.    At the level of L1-2, there is minimal disk but without significant spinal or neural foraminal stenosis.    At the T11-12, there is minimal disk  "bulge causing mild effacement of the ventral subarachnoid space.    The conus terminates at the level of L1.    The visualized pre-and paraspinal soft tissues appear normal.    Mild bilateral sacroiliac joint degeneration is seen.   Impression       1. Mild to moderate degenerative disease in the lumbar spine as described above.  There has been no significant interval change.  2. Chronic compression deformity at T12 and L1.  There has been no significant interval change in  3. Schmorl's nodes at the inferior endplates of T12 and L1.  The T12 Schmorl's node is increased in size since the prior study.         Anxiety associated with depression  Has history of difficult event that occurred around the holidays a couple of years ago. Reports every year around this time he has a more difficult time with anxiety and depression. He generally uses clonazepam when symptoms are severe and he is requesting refill of this.       Diagnoses of Chronic neck pain, Chronic left-sided low back pain without sciatica, and Anxiety associated with depression were pertinent to this visit.    Allergies: Review of patient's allergies indicates no known allergies.  Current medicines (including changes today)  Current Outpatient Prescriptions   Medication Sig Dispense Refill   • clonazepam (KLONOPIN) 1 MG Tab Take 0.5 Tabs by mouth 2 times a day as needed. TWICE DAILY 30 Tab 0     No current facility-administered medications for this visit.        He  has a past medical history of Anxiety (9/19/2013); Arthritis; Colon polyps (2001, 9/09, 10/12); Dyslipidemia (3/31/2017); Neck pain (6/26/2015); and Spinal stenosis. He also has no past medical history of Diabetes (CMS-[facility]) or Hypertension.    Health Maintenance: colonoscopy scheduled    ROS  As stated in HPI      Objective:     Blood pressure 110/76, pulse 80, temperature 36.6 °C (97.8 °F), resp. rate 16, height 1.93 m (6' 4\"), weight (!) 132.5 kg (292 lb), SpO2 96 %. Body mass index is 35.54 " kg/m².  Physical Exam:  General: Alert, oriented, in no acute distress.  Eye contact is good, speech goal directed, affect calm  CNs grossly intact.  HEENT: conjunctiva non-injected, sclera non-icteric, EOMs intact.   Gross hearing intact.  Ext: no edema  Gait steady.     Assessment and Plan:   Assessment/Plan:  1. Chronic neck pain  Chronic problem worsening, refer to physiatry for consideration of further management  - REFERRAL TO PHYSIATRY (PMR)    2. Chronic left-sided low back pain without sciatica  Chronic problem worsening, refer to physiatry for consideration of further management  - REFERRAL TO PHYSIATRY (PMR)    3. Anxiety associated with depression  Discussed PRN use of medication only  - clonazepam (KLONOPIN) 1 MG Tab; Take 0.5 Tabs by mouth 2 times a day as needed. TWICE DAILY  Dispense: 30 Tab; Refill: 0       Follow up:  Return if symptoms worsen or fail to improve.    Educated in proper administration of medication(s) ordered today including safety, possible SE, risks, benefits, rationale and alternatives to therapy.   Supportive care, differential diagnoses, and indications for immediate follow-up discussed with patient.    Pathogenesis of diagnosis discussed including typical length and natural progression.    Instructed to return to clinic or nearest emergency department for any change in condition, further concerns, or worsening of symptoms.  Patient states understanding of the plan of care and discharge instructions.      Please note that this dictation was created using voice recognition software. I have made every reasonable attempt to correct obvious errors, but I expect that there are errors of grammar and possibly content that I did not discover before finalizing the note.    Followup: Return if symptoms worsen or fail to improve. sooner should new symptoms or problems arise.

## 2017-11-10 NOTE — ASSESSMENT & PLAN NOTE
Has history of difficult event that occurred around the holidays a couple of years ago. Reports every year around this time he has a more difficult time with anxiety and depression. He generally uses clonazepam when symptoms are severe and he is requesting refill of this.

## 2017-11-10 NOTE — ASSESSMENT & PLAN NOTE
Ongoing chronic problem slightly worsened over the past month. He has chronic pain in neck. There is no radiculopathy pain. No arm numbness, tingling, or weakness. Has hx of ablations in the past that worked well. Would like to discuss this again.   5/28/2014 8:33 AM    HISTORY/REASON FOR EXAM:  Neck pain, chronic. Increasing over last month.      TECHNIQUE/EXAM DESCRIPTION:  MRI of the cervical spine without contrast.    The study was performed on a [employer] Signa 1.5 Navya MRI scanner.  T1 sagittal, T2 fast spin-echo sagittal, and gradient-echo axial images were obtained of the cervical spine.    COMPARISON: None.    FINDINGS:    The cervical vertebral bodies have a normal  alignment.  There is moderate disc space narrowing C5-C6, mild at C6-C7 and C7-T1. The cervical cord has a normal caliber course and appearance.    Findings specific to each level are described below:    C2-3: No significant central canal or neural foraminal stenosis.  C3-4:  There is moderate right and mild left neural foraminal stenosis secondary to disc bulge and facet arthropathy.  C4-5:  There is severe left and mild-to-moderate right neural foraminal stenosis secondary to disc bulge and facet arthropathy.  C5-6: There is mild broad posterior disc bulge. No significant central canal or neural foraminal stenosis.  C6-7: There is mild focal posterior midline disc protrusion. The disk shows posterior midline linear T2 hyperintensity consistent with an annular defect or annular tear. No significant central canal or neural foraminal stenosis.  C7-T1: No significant central canal or neural foraminal stenosis.    The visualized prevertebral and posterior paraspinous soft tissues are grossly unremarkable.   Impression         1.  C6-C7 disc demonstrates a posterior midline linear T2 signal hyperintensity consistent with annular defect or tear.  2.  Multilevel degenerative changes of spine as described above.

## 2017-11-10 NOTE — ASSESSMENT & PLAN NOTE
Ongoing chronic problem. Slightly worsened over this past month. States pain low back localized without any radicular pain. Pain mostly on left low back. Not radiating. Has had ablations in the past and epidural once. Last ablation about 2 years ago. He would like to see physiatrist for discussion about non-surgical procedural options for pain.      HISTORY/REASON FOR EXAM:  Low Back Pain.      TECHNIQUE/EXAM DESCRIPTION:  MRI of the lumbar spine without contrast, 10/11/2013 7:43 AM.    The study was performed on a [employer] Signa 1.5 Navya MRI scanner.  T1 sagittal, T2 fast spin-echo sagittal, and T2 axial images were obtained of the lumbar spine.    COMPARISON:  12/1/07    FINDINGS:  The lumbar spine maintains normal alignment.There is no fracture or dislocation.  There are Schmorl's nodes  at the inferior endplates of the L1 and T12   There are  chronic compression deformities at T12 on L1.    At the level of L5-S1, there is diffuse disk bulge and facet joint arthropathy.  There is mild left neural foraminal stenosis.  There is no central canal stenosis.    At the level of L4-5, there is diffuse disk bulge and facet joint arthropathy.  There is mild bilateral neural foraminal stenosis.  There is no central canal stenosis.    At the level of L3-4, there is mild disk bulge and facet joint arthropathy.  There is mild bilateral neural foraminal stenosis.  There is no central canal stenosis.    At the level of the L2-3, there is mild disk bulge and facet joint arthropathy.  There is mild to moderate central canal and mild neural foraminal stenosis.    At the level of L1-2, there is minimal disk but without significant spinal or neural foraminal stenosis.    At the T11-12, there is minimal disk bulge causing mild effacement of the ventral subarachnoid space.    The conus terminates at the level of L1.    The visualized pre-and paraspinal soft tissues appear normal.    Mild bilateral sacroiliac joint degeneration is seen.    Impression       1. Mild to moderate degenerative disease in the lumbar spine as described above.  There has been no significant interval change.  2. Chronic compression deformity at T12 and L1.  There has been no significant interval change in  3. Schmorl's nodes at the inferior endplates of T12 and L1.  The T12 Schmorl's node is increased in size since the prior study.

## 2017-11-10 NOTE — ASSESSMENT & PLAN NOTE
Ongoing chronic problem. States pain low back localized without any radicular pain. Pain mostly on left low back.     Known arthritis in facet joints.   L3-S1. Has had ablations in the past and epidural. Last ablation about 2 years ago.

## 2018-01-04 ENCOUNTER — HOSPITAL ENCOUNTER (OUTPATIENT)
Dept: RADIOLOGY | Facility: REHABILITATION | Age: 53
End: 2018-01-04
Attending: PHYSICAL MEDICINE & REHABILITATION
Payer: COMMERCIAL

## 2018-01-04 ENCOUNTER — HOSPITAL ENCOUNTER (OUTPATIENT)
Dept: PAIN MANAGEMENT | Facility: REHABILITATION | Age: 53
End: 2018-01-04
Attending: PHYSICAL MEDICINE & REHABILITATION
Payer: COMMERCIAL

## 2018-01-04 VITALS
DIASTOLIC BLOOD PRESSURE: 80 MMHG | HEART RATE: 74 BPM | BODY MASS INDEX: 35.28 KG/M2 | HEIGHT: 76 IN | RESPIRATION RATE: 18 BRPM | TEMPERATURE: 98.4 F | WEIGHT: 289.68 LBS | SYSTOLIC BLOOD PRESSURE: 120 MMHG | OXYGEN SATURATION: 96 %

## 2018-01-04 PROCEDURE — 64635 DESTROY LUMB/SAC FACET JNT: CPT

## 2018-01-04 PROCEDURE — 700101 HCHG RX REV CODE 250

## 2018-01-04 PROCEDURE — 64636 DESTROY L/S FACET JNT ADDL: CPT

## 2018-01-04 PROCEDURE — 700111 HCHG RX REV CODE 636 W/ 250 OVERRIDE (IP)

## 2018-01-04 PROCEDURE — 99152 MOD SED SAME PHYS/QHP 5/>YRS: CPT

## 2018-01-04 RX ORDER — MIDAZOLAM HYDROCHLORIDE 1 MG/ML
INJECTION INTRAMUSCULAR; INTRAVENOUS
Status: COMPLETED
Start: 2018-01-04 | End: 2018-01-04

## 2018-01-04 RX ORDER — BUPIVACAINE HYDROCHLORIDE 5 MG/ML
INJECTION, SOLUTION EPIDURAL; INTRACAUDAL
Status: COMPLETED
Start: 2018-01-04 | End: 2018-01-04

## 2018-01-04 RX ORDER — LIDOCAINE HYDROCHLORIDE 10 MG/ML
INJECTION, SOLUTION EPIDURAL; INFILTRATION; INTRACAUDAL; PERINEURAL
Status: COMPLETED
Start: 2018-01-04 | End: 2018-01-04

## 2018-01-04 RX ADMIN — MIDAZOLAM HYDROCHLORIDE 1 MG: 1 INJECTION, SOLUTION INTRAMUSCULAR; INTRAVENOUS at 14:11

## 2018-01-04 RX ADMIN — FENTANYL CITRATE 50 MCG: 50 INJECTION, SOLUTION INTRAMUSCULAR; INTRAVENOUS at 14:12

## 2018-01-04 RX ADMIN — BUPIVACAINE HYDROCHLORIDE 4 ML: 5 INJECTION, SOLUTION EPIDURAL; INTRACAUDAL; PERINEURAL at 14:26

## 2018-01-04 RX ADMIN — LIDOCAINE HYDROCHLORIDE 10 ML: 10 INJECTION, SOLUTION EPIDURAL; INFILTRATION; INTRACAUDAL; PERINEURAL at 14:14

## 2018-01-04 ASSESSMENT — PAIN SCALES - GENERAL
PAINLEVEL_OUTOF10: 3
PAINLEVEL_OUTOF10: 1
PAINLEVEL_OUTOF10: 1

## 2018-01-04 NOTE — PROGRESS NOTES
Patient positioned pre-procedure by RN,CNA and xray tech. Pillow placed under lower legs and feet for support.Grounding applied to right calf by CNA and verified by RN.

## 2018-01-04 NOTE — PROGRESS NOTES
Received ambulatory accompanied by RN. Handoff reported by ROMINA Harvey RN. Patient awake, alert and verbally responsive. Tolerated fluids well.  Vital signs monitored every 15 minutes ( see epic doc. VS template) and stable within patient usual range. Patient able to  move all extremities without difficulty voluntarily and on command. Reviewed home care instructions and understood by patient. .  evaluated patient. There's no evidence of procedural complication noted.

## 2018-01-04 NOTE — PROGRESS NOTES
Medication reconciliation reviewed with patient. Denied taking any blood thinners and any  any anti- inflammatories medications..Patient had a  Aneta / spouse .Home care form and verbal  instruction given to patient and verbalized understanding. Dr. Mitchell made aware & spoke to the patient. . Hand off reported to ROMINA Harvey RN.

## 2018-01-25 ENCOUNTER — HOSPITAL ENCOUNTER (OUTPATIENT)
Dept: RADIOLOGY | Facility: MEDICAL CENTER | Age: 53
End: 2018-01-25
Attending: PHYSICIAN ASSISTANT
Payer: COMMERCIAL

## 2018-01-25 DIAGNOSIS — M54.2 NECK PAIN: ICD-10-CM

## 2018-01-25 DIAGNOSIS — M54.50 LEFT-SIDED LOW BACK PAIN WITHOUT SCIATICA, UNSPECIFIED CHRONICITY: ICD-10-CM

## 2018-01-25 PROCEDURE — 72100 X-RAY EXAM L-S SPINE 2/3 VWS: CPT

## 2018-01-25 PROCEDURE — 72040 X-RAY EXAM NECK SPINE 2-3 VW: CPT

## 2018-09-19 ENCOUNTER — IMMUNIZATION (OUTPATIENT)
Dept: OCCUPATIONAL MEDICINE | Facility: CLINIC | Age: 53
End: 2018-09-19

## 2018-09-19 DIAGNOSIS — Z23 NEED FOR VACCINATION: ICD-10-CM

## 2018-09-19 PROCEDURE — 90686 IIV4 VACC NO PRSV 0.5 ML IM: CPT | Performed by: PREVENTIVE MEDICINE

## 2018-09-29 ENCOUNTER — HOSPITAL ENCOUNTER (OUTPATIENT)
Dept: LAB | Facility: MEDICAL CENTER | Age: 53
End: 2018-09-29
Payer: COMMERCIAL

## 2018-09-29 LAB
BDY FAT % MEASURED: 24.5 %
BP DIAS: 65 MMHG
BP SYS: 116 MMHG
CHOLEST SERPL-MCNC: 119 MG/DL (ref 100–199)
DIABETES HTDIA: NO
EVENT NAME HTEVT: NORMAL
FASTING HTFAS: YES
GLUCOSE SERPL-MCNC: 98 MG/DL (ref 65–99)
HDLC SERPL-MCNC: 29 MG/DL
HYPERTENSION HTHYP: NO
LDLC SERPL CALC-MCNC: 63 MG/DL
SCREENING LOC CITY HTCIT: NORMAL
SCREENING LOC STATE HTSTA: NORMAL
SCREENING LOCATION HTLOC: NORMAL
SMOKING HTSMO: NO
SUBSCRIBER ID HTSID: NORMAL
TRIGL SERPL-MCNC: 133 MG/DL (ref 0–149)

## 2018-09-29 PROCEDURE — 36415 COLL VENOUS BLD VENIPUNCTURE: CPT

## 2018-09-29 PROCEDURE — 82947 ASSAY GLUCOSE BLOOD QUANT: CPT

## 2018-09-29 PROCEDURE — 80061 LIPID PANEL: CPT

## 2018-09-29 PROCEDURE — S5190 WELLNESS ASSESSMENT BY NONPH: HCPCS

## 2018-12-07 ENCOUNTER — NON-PROVIDER VISIT (OUTPATIENT)
Dept: OCCUPATIONAL MEDICINE | Facility: CLINIC | Age: 53
End: 2018-12-07

## 2019-01-22 ENCOUNTER — OFFICE VISIT (OUTPATIENT)
Dept: MEDICAL GROUP | Facility: PHYSICIAN GROUP | Age: 54
End: 2019-01-22
Payer: COMMERCIAL

## 2019-01-22 VITALS
WEIGHT: 290 LBS | OXYGEN SATURATION: 97 % | BODY MASS INDEX: 35.31 KG/M2 | SYSTOLIC BLOOD PRESSURE: 128 MMHG | HEIGHT: 76 IN | DIASTOLIC BLOOD PRESSURE: 86 MMHG | TEMPERATURE: 96.4 F | HEART RATE: 114 BPM

## 2019-01-22 DIAGNOSIS — R51.9 FREQUENT HEADACHES: ICD-10-CM

## 2019-01-22 PROCEDURE — 99214 OFFICE O/P EST MOD 30 MIN: CPT | Performed by: NURSE PRACTITIONER

## 2019-01-22 RX ORDER — SUMATRIPTAN 50 MG/1
50 TABLET, FILM COATED ORAL
Qty: 10 TAB | Refills: 3 | Status: SHIPPED | OUTPATIENT
Start: 2019-01-22 | End: 2020-04-07

## 2019-01-22 RX ORDER — BUTALBITAL, ACETAMINOPHEN AND CAFFEINE 300; 40; 50 MG/1; MG/1; MG/1
1-2 CAPSULE ORAL EVERY 6 HOURS PRN
Qty: 30 CAP | Refills: 0 | Status: SHIPPED
Start: 2019-01-22 | End: 2019-02-21

## 2019-01-22 ASSESSMENT — PATIENT HEALTH QUESTIONNAIRE - PHQ9: CLINICAL INTERPRETATION OF PHQ2 SCORE: 0

## 2019-01-23 NOTE — PROGRESS NOTES
Subjective:     Chief Complaint   Patient presents with   • Headache     x4 months       HPI  Chris Barr is a 53 y.o. male here today for headaches    Frontal headache  Patient has long standing hx of intermittent headaches. He was getting headaches a few times a year very similar to what he is having now. The pain was right behind eyes with nausea. These were occurring a few times a year, but now a few times a month.   Episodes last 2 days on average. They seem to always occur on the weekends.   The pain is frontal and maxillary sinus region and behind eyes, but eyes themselves do not hurt.   Pain described as constant (not throbbing)   +nausea. No vomiting  No photophobia or phonophobia.   Aspirin is not helping, where previously it did help.     He has no noticeable allergy symptoms. Has tried allergy medications, but nothing has been consistently used. No recent sinus infections.  No new neck pain or stiffness. No occipital pain in head.   Wears glasses. Last saw eye doctor about one year ago. No blurred vision or diplopia. No hx of glaucoma.   Denies any major stressors or life changes.        The encounter diagnosis was Frequent headaches.    Allergies: Patient has no known allergies.  Current medicines (including changes today)  Current Outpatient Prescriptions   Medication Sig Dispense Refill   • acetaminophen/caffeine/butalbital 300-40-50 mg (FIORICET) -40 MG Cap capsule Take 1-2 Caps by mouth every 6 hours as needed for Headache for up to 30 days. Try this first 30 Cap 0   • SUMAtriptan (IMITREX) 50 MG Tab Take 1 Tab by mouth Once PRN for Migraine (repeat 50 mg dose in 2 hours if not helping) for up to 1 dose. 10 Tab 3   • clonazepam (KLONOPIN) 1 MG Tab Take 0.5 Tabs by mouth 2 times a day as needed. TWICE DAILY (Patient not taking: Reported on 1/22/2019) 30 Tab 0     No current facility-administered medications for this visit.        He  has a past medical history of Anxiety (9/19/2013);  "Arthritis; Colon polyps (2001, 9/09, 10/12); Dyslipidemia (3/31/2017); Migraine; Neck pain (6/26/2015); and Spinal stenosis. He also has no past medical history of Diabetes (HCC) or Hypertension.      ROS  As stated in HPI and additionally  Gen: No F/C, fatigue, malaise  HEENT: No sinus pressure, congestion, sore throat, otalgia, or temporal pain   Neuro: No vision changes, numbness, tingling, or extremity weakness      Objective:     Blood pressure 128/86, pulse (!) 114, temperature (!) 35.8 °C (96.4 °F), temperature source Temporal, height 1.93 m (6' 4\"), weight (!) 131.5 kg (290 lb), SpO2 97 %. Body mass index is 35.3 kg/m².  Physical Exam:  General: Alert, oriented, in no acute distress.  Eye contact is good, speech goal directed, affect calm  Neuro: Neuro Exam: Cranial nerves 2-12 intact, strength intact all four extremities proximal and distal, sensation intact to soft touch all four extremities, gait normal, Finger-to-nose is symmetric  CNs grossly intact.  HEENT: conjunctiva non-injected, sclera non-icteric, EOMs intact. PERRL. No lid edema or eye drainage.   Pinna normal without skin lesions. TM pearly michael. Gross hearing intact.  Oral mucous membranes pink and moist with no lesions. Oropharynx without erythema, or exudate.   Neck: Supple. No adenopathy or masses in the cervical or supraclavicular regions. No thyromegaly  Ext: no edema  Skin: No rashes or lesions in visible areas      Assessment and Plan:   Assessment/Plan:  1. Frequent headaches  Chronic problem acute worsened. Not controlled. Start imitrex and/or Fioricet for acute migraine relief. Check labs. Consider imaging if not responding to PRN relief medications   - CBC WITH DIFFERENTIAL; Future  - COMP METABOLIC PANEL; Future  - TSH WITH REFLEX TO FT4; Future  - WESTERGREN SED RATE; Future  - CRP QUANTITIVE (NON-CARDIAC); Future  - acetaminophen/caffeine/butalbital 300-40-50 mg (FIORICET) -40 MG Cap capsule; Take 1-2 Caps by mouth every 6 " hours as needed for Headache for up to 30 days. Try this first  Dispense: 30 Cap; Refill: 0  - SUMAtriptan (IMITREX) 50 MG Tab; Take 1 Tab by mouth Once PRN for Migraine (repeat 50 mg dose in 2 hours if not helping) for up to 1 dose.  Dispense: 10 Tab; Refill: 3       Follow up:  Return in about 6 weeks (around 3/5/2019).    Educated in proper administration of medication(s) ordered today including safety, possible SE, risks, benefits, rationale and alternatives to therapy.   Supportive care, differential diagnoses, and indications for immediate follow-up discussed with patient.    Pathogenesis of diagnosis discussed including typical length and natural progression.    Instructed to return to clinic or nearest emergency department for any change in condition, further concerns, or worsening of symptoms.  Patient states understanding of the plan of care and discharge instructions.      Please note that this dictation was created using voice recognition software. I have made every reasonable attempt to correct obvious errors, but I expect that there are errors of grammar and possibly content that I did not discover before finalizing the note.    Followup: Return in about 6 weeks (around 3/5/2019). sooner should new symptoms or problems arise.

## 2019-01-23 NOTE — ASSESSMENT & PLAN NOTE
Patient has long standing hx of intermittent headaches. He was getting headaches a few times a year very similar to what he is having now. The pain was right behind eyes with nausea. These were occurring a few times a year, but now a few times a month.   Episodes last 2 days on average. They seem to always occur on the weekends.   The pain is frontal and maxillary sinus region and behind eyes, but eyes themselves do not hurt.   Pain described as constant (not throbbing)   +nausea. No vomiting  No photophobia or phonophobia.   Aspirin is not helping, where previously it did help.     He has no noticeable allergy symptoms. Has tried allergy medications, but nothing has been consistently used. No recent sinus infections.  No new neck pain or stiffness. No occipital pain in head.   Wears glasses. Last saw eye doctor about one year ago. No blurred vision or diplopia. No hx of glaucoma.   Denies any major stressors or life changes.

## 2019-01-24 ENCOUNTER — HOSPITAL ENCOUNTER (OUTPATIENT)
Dept: LAB | Facility: MEDICAL CENTER | Age: 54
End: 2019-01-24
Attending: NURSE PRACTITIONER
Payer: COMMERCIAL

## 2019-01-24 DIAGNOSIS — R51.9 FREQUENT HEADACHES: ICD-10-CM

## 2019-01-24 LAB
ALBUMIN SERPL BCP-MCNC: 4.4 G/DL (ref 3.2–4.9)
ALBUMIN/GLOB SERPL: 2 G/DL
ALP SERPL-CCNC: 64 U/L (ref 30–99)
ALT SERPL-CCNC: 40 U/L (ref 2–50)
ANION GAP SERPL CALC-SCNC: 7 MMOL/L (ref 0–11.9)
AST SERPL-CCNC: 26 U/L (ref 12–45)
BASOPHILS # BLD AUTO: 0.5 % (ref 0–1.8)
BASOPHILS # BLD: 0.03 K/UL (ref 0–0.12)
BILIRUB SERPL-MCNC: 1 MG/DL (ref 0.1–1.5)
BUN SERPL-MCNC: 22 MG/DL (ref 8–22)
CALCIUM SERPL-MCNC: 9.5 MG/DL (ref 8.5–10.5)
CHLORIDE SERPL-SCNC: 106 MMOL/L (ref 96–112)
CO2 SERPL-SCNC: 24 MMOL/L (ref 20–33)
CREAT SERPL-MCNC: 0.71 MG/DL (ref 0.5–1.4)
CRP SERPL HS-MCNC: 0.21 MG/DL (ref 0–0.75)
EOSINOPHIL # BLD AUTO: 0.13 K/UL (ref 0–0.51)
EOSINOPHIL NFR BLD: 2.1 % (ref 0–6.9)
ERYTHROCYTE [DISTWIDTH] IN BLOOD BY AUTOMATED COUNT: 41.6 FL (ref 35.9–50)
ERYTHROCYTE [SEDIMENTATION RATE] IN BLOOD BY WESTERGREN METHOD: 3 MM/HOUR (ref 0–20)
GLOBULIN SER CALC-MCNC: 2.2 G/DL (ref 1.9–3.5)
GLUCOSE SERPL-MCNC: 112 MG/DL (ref 65–99)
HCT VFR BLD AUTO: 48.8 % (ref 42–52)
HGB BLD-MCNC: 16.6 G/DL (ref 14–18)
IMM GRANULOCYTES # BLD AUTO: 0.01 K/UL (ref 0–0.11)
IMM GRANULOCYTES NFR BLD AUTO: 0.2 % (ref 0–0.9)
LYMPHOCYTES # BLD AUTO: 1.87 K/UL (ref 1–4.8)
LYMPHOCYTES NFR BLD: 30.1 % (ref 22–41)
MCH RBC QN AUTO: 30.3 PG (ref 27–33)
MCHC RBC AUTO-ENTMCNC: 34 G/DL (ref 33.7–35.3)
MCV RBC AUTO: 89.2 FL (ref 81.4–97.8)
MONOCYTES # BLD AUTO: 0.65 K/UL (ref 0–0.85)
MONOCYTES NFR BLD AUTO: 10.5 % (ref 0–13.4)
NEUTROPHILS # BLD AUTO: 3.53 K/UL (ref 1.82–7.42)
NEUTROPHILS NFR BLD: 56.6 % (ref 44–72)
NRBC # BLD AUTO: 0 K/UL
NRBC BLD-RTO: 0 /100 WBC
PLATELET # BLD AUTO: 205 K/UL (ref 164–446)
PMV BLD AUTO: 10.7 FL (ref 9–12.9)
POTASSIUM SERPL-SCNC: 3.6 MMOL/L (ref 3.6–5.5)
PROT SERPL-MCNC: 6.6 G/DL (ref 6–8.2)
RBC # BLD AUTO: 5.47 M/UL (ref 4.7–6.1)
SODIUM SERPL-SCNC: 137 MMOL/L (ref 135–145)
TSH SERPL DL<=0.005 MIU/L-ACNC: 1.2 UIU/ML (ref 0.38–5.33)
WBC # BLD AUTO: 6.2 K/UL (ref 4.8–10.8)

## 2019-01-24 PROCEDURE — 85025 COMPLETE CBC W/AUTO DIFF WBC: CPT

## 2019-01-24 PROCEDURE — 80053 COMPREHEN METABOLIC PANEL: CPT

## 2019-01-24 PROCEDURE — 36415 COLL VENOUS BLD VENIPUNCTURE: CPT

## 2019-01-24 PROCEDURE — 86140 C-REACTIVE PROTEIN: CPT

## 2019-01-24 PROCEDURE — 84443 ASSAY THYROID STIM HORMONE: CPT

## 2019-01-24 PROCEDURE — 85652 RBC SED RATE AUTOMATED: CPT

## 2019-03-21 DIAGNOSIS — R51.9 FRONTAL HEADACHE: ICD-10-CM

## 2019-03-22 RX ORDER — BUTALBITAL, ACETAMINOPHEN AND CAFFEINE 300; 40; 50 MG/1; MG/1; MG/1
CAPSULE ORAL
Qty: 30 CAP | Refills: 0 | Status: SHIPPED
Start: 2019-03-22 | End: 2019-04-21

## 2019-09-25 ENCOUNTER — IMMUNIZATION (OUTPATIENT)
Dept: OCCUPATIONAL MEDICINE | Facility: CLINIC | Age: 54
End: 2019-09-25

## 2019-09-25 DIAGNOSIS — Z23 NEED FOR VACCINATION: ICD-10-CM

## 2019-09-25 PROCEDURE — 90686 IIV4 VACC NO PRSV 0.5 ML IM: CPT | Performed by: PREVENTIVE MEDICINE

## 2019-09-27 ENCOUNTER — HOSPITAL ENCOUNTER (OUTPATIENT)
Dept: LAB | Facility: MEDICAL CENTER | Age: 54
End: 2019-09-27
Payer: COMMERCIAL

## 2019-09-27 LAB
BDY FAT % MEASURED: 31.6 %
BP DIAS: 87 MMHG
BP SYS: 128 MMHG
CHOLEST SERPL-MCNC: 148 MG/DL (ref 100–199)
DIABETES HTDIA: NO
EVENT NAME HTEVT: NORMAL
FASTING HTFAS: YES
GLUCOSE SERPL-MCNC: 91 MG/DL (ref 65–99)
HDLC SERPL-MCNC: 26 MG/DL
HYPERTENSION HTHYP: NO
LDLC SERPL CALC-MCNC: 79 MG/DL
SCREENING LOC CITY HTCIT: NORMAL
SCREENING LOC STATE HTSTA: NORMAL
SCREENING LOCATION HTLOC: NORMAL
SMOKING HTSMO: NO
SUBSCRIBER ID HTSID: NORMAL
TRIGL SERPL-MCNC: 214 MG/DL (ref 0–149)

## 2019-09-27 PROCEDURE — S5190 WELLNESS ASSESSMENT BY NONPH: HCPCS

## 2019-09-27 PROCEDURE — 82947 ASSAY GLUCOSE BLOOD QUANT: CPT

## 2019-09-27 PROCEDURE — 80061 LIPID PANEL: CPT

## 2019-09-27 PROCEDURE — 36415 COLL VENOUS BLD VENIPUNCTURE: CPT

## 2019-10-08 ENCOUNTER — OFFICE VISIT (OUTPATIENT)
Dept: MEDICAL GROUP | Facility: MEDICAL CENTER | Age: 54
End: 2019-10-08
Payer: COMMERCIAL

## 2019-10-08 VITALS
TEMPERATURE: 98 F | DIASTOLIC BLOOD PRESSURE: 82 MMHG | OXYGEN SATURATION: 94 % | RESPIRATION RATE: 16 BRPM | HEART RATE: 98 BPM | WEIGHT: 301 LBS | BODY MASS INDEX: 36.65 KG/M2 | HEIGHT: 76 IN | SYSTOLIC BLOOD PRESSURE: 136 MMHG

## 2019-10-08 DIAGNOSIS — R51.9 FRONTAL HEADACHE: ICD-10-CM

## 2019-10-08 DIAGNOSIS — Z12.5 SCREENING PSA (PROSTATE SPECIFIC ANTIGEN): ICD-10-CM

## 2019-10-08 DIAGNOSIS — E78.5 DYSLIPIDEMIA: ICD-10-CM

## 2019-10-08 DIAGNOSIS — Z00.00 PREVENTATIVE HEALTH CARE: ICD-10-CM

## 2019-10-08 DIAGNOSIS — E66.9 OBESITY (BMI 30-39.9): ICD-10-CM

## 2019-10-08 DIAGNOSIS — Z76.89 ENCOUNTER TO ESTABLISH CARE: ICD-10-CM

## 2019-10-08 PROBLEM — K42.9 UMBILICAL HERNIA: Status: RESOLVED | Noted: 2017-06-28 | Resolved: 2019-10-08

## 2019-10-08 PROCEDURE — 99214 OFFICE O/P EST MOD 30 MIN: CPT | Performed by: PHYSICIAN ASSISTANT

## 2019-10-08 NOTE — ASSESSMENT & PLAN NOTE
This is a 54-year-old male who is here today to establish care.  Has a chronic history of seasonal headaches.  Typically they will occur in the fall in the winter.  Typically also on Fridays on the weekends.  He will have frontal or maxillary pain.  Denies any change of vision.  No nausea vomiting.  No chest pain or shortness of breath.  Does have a history of neck pain.  His last PCP thought possibly the symptoms were related to the neck.  He was also given Imitrex.  States that the medication appeared to be working but ran out of it.  States the other morning he woke up with some pain under his right eye.  Denies any current fevers.  Has been taking aspirin and Tylenol large quantities.

## 2019-10-08 NOTE — PROGRESS NOTES
"Subjective:   Chris Barr is a 54 y.o. male here today for establishing care, frontal headaches chronically and seasonally.  Dyslipidemia.    Frontal headache  This is a 54-year-old male who is here today to establish care.  Has a chronic history of seasonal headaches.  Typically they will occur in the fall in the winter.  Typically also on Fridays on the weekends.  He will have frontal or maxillary pain.  Denies any change of vision.  No nausea vomiting.  No chest pain or shortness of breath.  Does have a history of neck pain.  His last PCP thought possibly the symptoms were related to the neck.  He was also given Imitrex.  States that the medication appeared to be working but ran out of it.  States the other morning he woke up with some pain under his right eye.  Denies any current fevers.  Has been taking aspirin and Tylenol large quantities.    Dyslipidemia  Chronic condition.  Last cholesterol profile only showed elevated triglycerides.  LDL was at 79.       Current medicines (including changes today)  Current Outpatient Medications   Medication Sig Dispense Refill   • SUMAtriptan (IMITREX) 50 MG Tab Take 1 Tab by mouth Once PRN for Migraine (repeat 50 mg dose in 2 hours if not helping) for up to 1 dose. 10 Tab 3     No current facility-administered medications for this visit.      He  has a past medical history of Anxiety (9/19/2013), Arthritis, Colon polyps (2001, 9/09, 10/12), Dyslipidemia (3/31/2017), Migraine, Neck pain (6/26/2015), and Spinal stenosis. He also has no past medical history of Diabetes (HCC) or Hypertension.    Social History and Family History were reviewed and updated.    ROS   No chest pain, no shortness of breath, no abdominal pain and all other systems were reviewed and are negative.       Objective:     /82 (BP Location: Right arm, Patient Position: Sitting, BP Cuff Size: Adult)   Pulse 98   Temp 36.7 °C (98 °F) (Temporal)   Resp 16   Ht 1.93 m (6' 4\")   Wt (!) 136.5 kg " (301 lb)   SpO2 94%  Body mass index is 36.64 kg/m².   Physical Exam:  Constitutional: Alert, no distress.  Skin: Warm, dry, good turgor, no rashes in visible areas.  Eye: Equal, round and reactive, conjunctiva clear, lids normal.  ENMT: Lips without lesions, good dentition, oropharynx clear.  No sinus tenderness to palpation.  Neck: Trachea midline, no masses.   Lymph: No cervical or supraclavicular lymphadenopathy  Respiratory: Unlabored respiratory effort, lungs clear to auscultation, no wheezes, no ronchi.  Cardiovascular: Normal S1, S2, no murmur, no edema.  Psych: Alert and oriented x3, normal affect and mood.        Assessment and Plan:   The following treatment plan was discussed    1. Frontal headache  Chronic condition.  Advised that his headaches appear to be sinus related not secondary to his neck.  Discussed taking 3 medications simultaneously.  Would try antihistamine, nasal decongestants and nonsteroidal medications.  Discussed quantities.  Advised to take as needed if symptoms improved take medication as directed.  If no improvement contact me for possible referral to see ENT.    2. Dyslipidemia  Chronic condition.  Stable.  Recent cholesterol profile only showed elevated triglycerides.  Otherwise LDL in good range.  We will continue to monitor annually.    3. Obesity (BMI 30-39.9)  Chronic condition.  Stable.  We will continue to monitor.  - Patient identified as having weight management issue.  Appropriate orders and counseling given.    4. Preventative health care  Ordered vitamin D level.  Screening.  - VITAMIN D,25 HYDROXY; Future    5. Screening PSA (prostate specific antigen)  Ordered PSA for screening purposes.  - PROSTATE SPECIFIC AG SCREENING; Future    6. Encounter to establish care  Elevated blood pressure today.  Twice has been elevated.  Advised to follow-up in 6 months to repeat reading.      Followup: Return in about 6 months (around 4/8/2020), or if symptoms worsen or fail to  improve.    Please note that this dictation was created using voice recognition software. I have made every reasonable attempt to correct obvious errors, but I expect that there are errors of grammar and possibly content that I did not discover before finalizing the note.

## 2020-03-25 ENCOUNTER — EH NON-PROVIDER (OUTPATIENT)
Dept: OCCUPATIONAL MEDICINE | Facility: CLINIC | Age: 55
End: 2020-03-25

## 2020-03-25 PROCEDURE — 94375 RESPIRATORY FLOW VOLUME LOOP: CPT | Performed by: PHYSICIAN ASSISTANT

## 2020-04-07 ENCOUNTER — OFFICE VISIT (OUTPATIENT)
Dept: MEDICAL GROUP | Facility: MEDICAL CENTER | Age: 55
End: 2020-04-07
Payer: COMMERCIAL

## 2020-04-07 VITALS
BODY MASS INDEX: 35.71 KG/M2 | RESPIRATION RATE: 16 BRPM | HEART RATE: 110 BPM | TEMPERATURE: 98.3 F | OXYGEN SATURATION: 95 % | SYSTOLIC BLOOD PRESSURE: 118 MMHG | WEIGHT: 293.21 LBS | HEIGHT: 76 IN | DIASTOLIC BLOOD PRESSURE: 64 MMHG

## 2020-04-07 DIAGNOSIS — G89.29 CHRONIC NECK PAIN: ICD-10-CM

## 2020-04-07 DIAGNOSIS — E78.5 DYSLIPIDEMIA: ICD-10-CM

## 2020-04-07 DIAGNOSIS — Z12.5 SCREENING PSA (PROSTATE SPECIFIC ANTIGEN): ICD-10-CM

## 2020-04-07 DIAGNOSIS — R51.9 SINUS HEADACHE: ICD-10-CM

## 2020-04-07 DIAGNOSIS — Z00.00 PREVENTATIVE HEALTH CARE: ICD-10-CM

## 2020-04-07 DIAGNOSIS — M54.2 CHRONIC NECK PAIN: ICD-10-CM

## 2020-04-07 DIAGNOSIS — M51.9 LUMBAR DISC DISEASE: ICD-10-CM

## 2020-04-07 PROCEDURE — 99214 OFFICE O/P EST MOD 30 MIN: CPT | Performed by: PHYSICIAN ASSISTANT

## 2020-04-07 ASSESSMENT — PATIENT HEALTH QUESTIONNAIRE - PHQ9: CLINICAL INTERPRETATION OF PHQ2 SCORE: 0

## 2020-04-07 ASSESSMENT — FIBROSIS 4 INDEX: FIB4 SCORE: 1.08

## 2020-04-07 NOTE — ASSESSMENT & PLAN NOTE
Recent cholesterol profile showed elevated triglycerides but his cholesterol profile was in good range.

## 2020-04-07 NOTE — ASSESSMENT & PLAN NOTE
This is a 55-year-old male here today to discuss a few of his chronic medical concerns.  Actually is doing pretty well.  Did have a headache this past weekend.  Typical frontal head pain.  States that steam from the shower will help.  Also purchased a sinus rinse which he uses.  Sumatriptan was not effective so he never took the medication.  Actually caused him to have headaches.

## 2020-04-07 NOTE — PROGRESS NOTES
"Subjective:   Chris Barr is a 55 y.o. male here today for sinus headaches, chronic neck and back pain, dyslipidemia and preventative health care.    Sinus headache  This is a 55-year-old male here today to discuss a few of his chronic medical concerns.  Actually is doing pretty well.  Did have a headache this past weekend.  Typical frontal head pain.  States that steam from the shower will help.  Also purchased a sinus rinse which he uses.  Sumatriptan was not effective so he never took the medication.  Actually caused him to have headaches.    Chronic neck pain  Does have concern for chronic neck and back pain.  Recently started to pain his home.  Is off of work for 2 weeks.  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  His wife unfortunately was recently diagnosed with colon cancer.  She is 49 years of age.  Had a 50 mm mass.  She is currently going through chemo.  Will have surgery in the near future.  Dr. Najera.    Dyslipidemia  Recent cholesterol profile showed elevated triglycerides but his cholesterol profile was in good range.       Current medicines (including changes today)  No current outpatient medications on file.     No current facility-administered medications for this visit.      He  has a past medical history of Anxiety (9/19/2013), Arthritis, Colon polyps (2001, 9/09, 10/12), Dyslipidemia (3/31/2017), Migraine, Neck pain (6/26/2015), and Spinal stenosis. He also has no past medical history of Diabetes (HCC) or Hypertension.    Social History and Family History were reviewed and updated.    ROS   No chest pain, no shortness of breath, no abdominal pain and all other systems were reviewed and are negative.       Objective:     /64   Pulse (!) 110   Temp 36.8 °C (98.3 °F) (Temporal)   Resp 16   Ht 1.93 m (6' 4\")   Wt (!) 133 kg (293 lb 3.4 oz)   SpO2 95%  Body mass index is 35.69 kg/m².   Physical Exam:  Constitutional: Alert, no distress.  Skin: Warm, dry, good turgor, no rashes in visible " areas.  Eye: Equal, round and reactive, conjunctiva clear, lids normal.  ENMT: Lips without lesions, good dentition, oropharynx clear.  Neck: Trachea midline, no masses.   Respiratory: Unlabored respiratory effort, lungs appear clear, no wheezes.  Cardiovascular: Regular rate and rhythm.  Psych: Alert and oriented x3, normal affect and mood.        Assessment and Plan:   The following treatment plan was discussed    1. Sinus headache  Condition.  Stable.  Continue conservative therapy as needed.  We will continue to monitor.    2. Chronic neck pain  Chronic condition.  Stable.  We will continue to monitor.    3. Lumbar disc disease  Chronic condition.  Stable.  We will continue to monitor.    4. Dyslipidemia  Chronic condition.  Stable.  Follow-up in September for cholesterol profile performed through [facility].    5. Screening PSA (prostate specific antigen)  PSA ordered.  Asymptomatic.  - PROSTATE SPECIFIC AG SCREENING; Future    6. Preventative health care  Vitamin D level ordered.  Preventative health care.  - VITAMIN D,25 HYDROXY; Future      Followup: Return in about 9 months (around 1/7/2021), or if symptoms worsen or fail to improve.    Please note that this dictation was created using voice recognition software. I have made every reasonable attempt to correct obvious errors, but I expect that there are errors of grammar and possibly content that I did not discover before finalizing the note.

## 2020-04-07 NOTE — ASSESSMENT & PLAN NOTE
Does have concern for chronic neck and back pain.  Recently started to pain his home.  Is off of work for 2 weeks.  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  His wife unfortunately was recently diagnosed with colon cancer.  She is 49 years of age.  Had a 50 mm mass.  She is currently going through chemo.  Will have surgery in the near future.  Dr. Najera.

## 2020-09-18 ENCOUNTER — OFFICE VISIT (OUTPATIENT)
Dept: MEDICAL GROUP | Facility: MEDICAL CENTER | Age: 55
End: 2020-09-18
Payer: COMMERCIAL

## 2020-09-18 VITALS
HEIGHT: 76 IN | RESPIRATION RATE: 16 BRPM | DIASTOLIC BLOOD PRESSURE: 80 MMHG | WEIGHT: 308 LBS | BODY MASS INDEX: 37.51 KG/M2 | OXYGEN SATURATION: 96 % | HEART RATE: 88 BPM | TEMPERATURE: 97.9 F | SYSTOLIC BLOOD PRESSURE: 132 MMHG

## 2020-09-18 DIAGNOSIS — M54.2 CHRONIC NECK PAIN: ICD-10-CM

## 2020-09-18 DIAGNOSIS — Z12.5 SCREENING PSA (PROSTATE SPECIFIC ANTIGEN): ICD-10-CM

## 2020-09-18 DIAGNOSIS — G89.29 CHRONIC NECK PAIN: ICD-10-CM

## 2020-09-18 DIAGNOSIS — Z00.00 PREVENTATIVE HEALTH CARE: ICD-10-CM

## 2020-09-18 DIAGNOSIS — M51.9 LUMBAR DISC DISEASE: ICD-10-CM

## 2020-09-18 PROCEDURE — 99214 OFFICE O/P EST MOD 30 MIN: CPT | Performed by: PHYSICIAN ASSISTANT

## 2020-09-18 ASSESSMENT — FIBROSIS 4 INDEX: FIB4 SCORE: 1.08

## 2020-09-18 NOTE — ASSESSMENT & PLAN NOTE
This is a 55-year-old male here today to discuss his chronic history of neck and low back pain.  Has had multiple ablations of both parts of his spine.  In the past he was seen by Dr. Askew.  His back pain has been worse as of late.  Taking no prescribed medications for his symptoms.  Needs a referral.    Wife is doing okay.  Has a surgery for resection to be done by Dr. Najera in the future.  Chemo and radiation were not effective for her colon cancer.

## 2020-09-18 NOTE — PROGRESS NOTES
"Subjective:   Chris Barr is a 55 y.o. male here today for chronic neck and low back pain and preventative health care.    Chronic neck pain  This is a 55-year-old male here today to discuss his chronic history of neck and low back pain.  Has had multiple ablations of both parts of his spine.  In the past he was seen by Dr. Askew.  His back pain has been worse as of late.  Taking no prescribed medications for his symptoms.  Needs a referral.    Wife is doing okay.  Has a surgery for resection to be done by Dr. Najera in the future.  Chemo and radiation were not effective for her colon cancer.       Current medicines (including changes today)  No current outpatient medications on file.     No current facility-administered medications for this visit.      He  has a past medical history of Anxiety (9/19/2013), Arthritis, Colon polyps (2001, 9/09, 10/12), Dyslipidemia (3/31/2017), Migraine, Neck pain (6/26/2015), and Spinal stenosis. He also has no past medical history of Diabetes (HCC) or Hypertension.    Social History and Family History were reviewed and updated.    ROS   No chest pain, no shortness of breath, no abdominal pain and all other systems were reviewed and are negative.       Objective:     /80   Pulse 88   Temp 36.6 °C (97.9 °F) (Temporal)   Resp 16   Ht 1.93 m (6' 4\")   Wt (!) 139.7 kg (308 lb)   SpO2 96%  Body mass index is 37.49 kg/m².   Physical Exam:  Constitutional: Alert, no distress.  Skin: Warm, dry, good turgor, no rashes in visible areas.  Eye: Equal, round and reactive, conjunctiva clear, lids normal.  ENMT: Lips without lesions, good dentition, oropharynx clear.  Neck: Trachea midline, no masses.   Lymph: No cervical or supraclavicular lymphadenopathy  Respiratory: Unlabored respiratory effort, lungs appear clear, no wheezes.  Cardiovascular: Regular rate and rhythm.  Psych: Alert and oriented x3, normal affect and mood.        Assessment and Plan:   The following treatment " plan was discussed    1. Chronic neck pain  Chronic condition.  Referred to orthopedics for evaluation.  - REFERRAL TO ORTHOPEDICS    2. Lumbar disc disease  Chronic condition.  Refer to orthopedics for evaluation.  - REFERRAL TO ORTHOPEDICS    3. Screening PSA (prostate specific antigen)  PSA ordered.  Screening.  - PROSTATE SPECIFIC AG SCREENING; Future    4. Preventative health care  Order labs fasting.  He will be contacted with the results.  - VITAMIN D,25 HYDROXY; Future  - Lipid Profile; Future  - Comp Metabolic Panel; Future  - HEMOGLOBIN A1C; Future      Followup: Return if symptoms worsen or fail to improve.    Please note that this dictation was created using voice recognition software. I have made every reasonable attempt to correct obvious errors, but I expect that there are errors of grammar and possibly content that I did not discover before finalizing the note.

## 2020-09-21 ENCOUNTER — IMMUNIZATION (OUTPATIENT)
Dept: OCCUPATIONAL MEDICINE | Facility: CLINIC | Age: 55
End: 2020-09-21

## 2020-09-21 DIAGNOSIS — Z23 NEED FOR VACCINATION: ICD-10-CM

## 2020-09-21 PROCEDURE — 90686 IIV4 VACC NO PRSV 0.5 ML IM: CPT | Performed by: PREVENTIVE MEDICINE

## 2020-10-05 ENCOUNTER — HOSPITAL ENCOUNTER (OUTPATIENT)
Dept: RADIOLOGY | Facility: MEDICAL CENTER | Age: 55
End: 2020-10-05
Attending: NEUROLOGICAL SURGERY
Payer: COMMERCIAL

## 2020-10-05 DIAGNOSIS — M54.50 LOW BACK PAIN, UNSPECIFIED BACK PAIN LATERALITY, UNSPECIFIED CHRONICITY, UNSPECIFIED WHETHER SCIATICA PRESENT: ICD-10-CM

## 2020-10-05 DIAGNOSIS — M54.2 CERVICALGIA: ICD-10-CM

## 2020-10-05 PROCEDURE — 72050 X-RAY EXAM NECK SPINE 4/5VWS: CPT

## 2020-10-05 PROCEDURE — 72110 X-RAY EXAM L-2 SPINE 4/>VWS: CPT

## 2020-12-16 DIAGNOSIS — Z23 NEED FOR VACCINATION: ICD-10-CM

## 2020-12-17 ENCOUNTER — IMMUNIZATION (OUTPATIENT)
Dept: FAMILY PLANNING/WOMEN'S HEALTH CLINIC | Facility: IMMUNIZATION CENTER | Age: 55
End: 2020-12-17
Attending: FAMILY MEDICINE

## 2020-12-17 DIAGNOSIS — Z23 NEED FOR VACCINATION: ICD-10-CM

## 2020-12-17 DIAGNOSIS — Z23 ENCOUNTER FOR VACCINATION: Primary | ICD-10-CM

## 2020-12-17 PROCEDURE — 91300 PFIZER SARS-COV-2 VACCINE: CPT | Performed by: FAMILY MEDICINE

## 2020-12-17 PROCEDURE — 0001A PFIZER SARS-COV-2 VACCINE: CPT | Performed by: FAMILY MEDICINE

## 2021-01-07 ENCOUNTER — APPOINTMENT (OUTPATIENT)
Dept: MEDICAL GROUP | Facility: MEDICAL CENTER | Age: 56
End: 2021-01-07
Payer: COMMERCIAL

## 2021-01-08 ENCOUNTER — IMMUNIZATION (OUTPATIENT)
Dept: FAMILY PLANNING/WOMEN'S HEALTH CLINIC | Facility: IMMUNIZATION CENTER | Age: 56
End: 2021-01-08
Attending: FAMILY MEDICINE
Payer: COMMERCIAL

## 2021-01-08 DIAGNOSIS — Z23 ENCOUNTER FOR VACCINATION: Primary | ICD-10-CM

## 2021-01-08 PROCEDURE — 0002A PFIZER SARS-COV-2 VACCINE: CPT | Performed by: FAMILY MEDICINE

## 2021-01-08 PROCEDURE — 91300 PFIZER SARS-COV-2 VACCINE: CPT | Performed by: FAMILY MEDICINE

## 2021-09-22 ENCOUNTER — IMMUNIZATION (OUTPATIENT)
Dept: OCCUPATIONAL MEDICINE | Facility: CLINIC | Age: 56
End: 2021-09-22

## 2021-09-22 DIAGNOSIS — Z23 NEED FOR VACCINATION: Primary | ICD-10-CM

## 2021-09-22 PROCEDURE — 90686 IIV4 VACC NO PRSV 0.5 ML IM: CPT | Performed by: PREVENTIVE MEDICINE

## 2021-09-29 ENCOUNTER — IMMUNIZATION (OUTPATIENT)
Dept: OCCUPATIONAL MEDICINE | Facility: CLINIC | Age: 56
End: 2021-09-29

## 2021-09-29 DIAGNOSIS — Z23 ENCOUNTER FOR VACCINATION: Primary | ICD-10-CM

## 2021-09-29 PROCEDURE — 0003A PFIZER SARS-COV-2 VACCINE: CPT | Performed by: INTERNAL MEDICINE

## 2021-09-29 PROCEDURE — 91300 PFIZER SARS-COV-2 VACCINE: CPT | Performed by: INTERNAL MEDICINE

## 2021-12-06 ENCOUNTER — EH NON-PROVIDER (OUTPATIENT)
Dept: OCCUPATIONAL MEDICINE | Facility: CLINIC | Age: 56
End: 2021-12-06

## 2021-12-06 ENCOUNTER — HOSPITAL ENCOUNTER (OUTPATIENT)
Facility: MEDICAL CENTER | Age: 56
End: 2021-12-06
Attending: PREVENTIVE MEDICINE
Payer: COMMERCIAL

## 2021-12-06 DIAGNOSIS — Z11.59 ENCOUNTER FOR SCREENING FOR OTHER VIRAL DISEASES: Primary | ICD-10-CM

## 2021-12-06 DIAGNOSIS — Z11.59 ENCOUNTER FOR SCREENING FOR OTHER VIRAL DISEASES: ICD-10-CM

## 2021-12-06 PROCEDURE — U0003 INFECTIOUS AGENT DETECTION BY NUCLEIC ACID (DNA OR RNA); SEVERE ACUTE RESPIRATORY SYNDROME CORONAVIRUS 2 (SARS-COV-2) (CORONAVIRUS DISEASE [COVID-19]), AMPLIFIED PROBE TECHNIQUE, MAKING USE OF HIGH THROUGHPUT TECHNOLOGIES AS DESCRIBED BY CMS-2020-01-R: HCPCS | Performed by: PREVENTIVE MEDICINE

## 2021-12-07 LAB
COVID ORDER STATUS COVID19: NORMAL
SARS-COV-2 RNA RESP QL NAA+PROBE: NOTDETECTED
SPECIMEN SOURCE: NORMAL

## 2021-12-08 ENCOUNTER — TELEPHONE (OUTPATIENT)
Dept: OCCUPATIONAL MEDICINE | Facility: CLINIC | Age: 56
End: 2021-12-08

## 2021-12-08 NOTE — TELEPHONE ENCOUNTER
No pt identifier on VM. Did not leave a detailed message. Return # [phone]      Calling to notify the pt. of their  NEGATIVE COVID-19 test results.     With respect to COVID-19 monitoring, the Pt. is cleared to return to work on next scheduled shift.

## 2022-02-18 ENCOUNTER — EH NON-PROVIDER (OUTPATIENT)
Dept: OCCUPATIONAL MEDICINE | Facility: CLINIC | Age: 57
End: 2022-02-18

## 2022-09-27 ENCOUNTER — IMMUNIZATION (OUTPATIENT)
Dept: OCCUPATIONAL MEDICINE | Facility: CLINIC | Age: 57
End: 2022-09-27
Payer: COMMERCIAL

## 2022-09-27 DIAGNOSIS — Z23 NEED FOR VACCINATION: Primary | ICD-10-CM

## 2022-09-27 PROCEDURE — 90662 IIV NO PRSV INCREASED AG IM: CPT | Performed by: PREVENTIVE MEDICINE

## 2023-03-03 ENCOUNTER — EH NON-PROVIDER (OUTPATIENT)
Dept: OCCUPATIONAL MEDICINE | Facility: CLINIC | Age: 58
End: 2023-03-03
Payer: COMMERCIAL

## 2023-06-21 ENCOUNTER — OFFICE VISIT (OUTPATIENT)
Dept: URGENT CARE | Facility: CLINIC | Age: 58
End: 2023-06-21
Payer: COMMERCIAL

## 2023-06-21 VITALS
HEIGHT: 76 IN | BODY MASS INDEX: 37.14 KG/M2 | TEMPERATURE: 98 F | WEIGHT: 305 LBS | OXYGEN SATURATION: 96 % | DIASTOLIC BLOOD PRESSURE: 94 MMHG | HEART RATE: 102 BPM | RESPIRATION RATE: 12 BRPM | SYSTOLIC BLOOD PRESSURE: 138 MMHG

## 2023-06-21 DIAGNOSIS — H10.022 PINK EYE DISEASE OF LEFT EYE: ICD-10-CM

## 2023-06-21 DIAGNOSIS — J06.9 VIRAL URI WITH COUGH: Primary | ICD-10-CM

## 2023-06-21 LAB
FLUAV RNA SPEC QL NAA+PROBE: NEGATIVE
FLUBV RNA SPEC QL NAA+PROBE: NEGATIVE
RSV RNA SPEC QL NAA+PROBE: NEGATIVE
SARS-COV-2 RNA RESP QL NAA+PROBE: NEGATIVE

## 2023-06-21 PROCEDURE — 99213 OFFICE O/P EST LOW 20 MIN: CPT | Performed by: FAMILY MEDICINE

## 2023-06-21 PROCEDURE — 3080F DIAST BP >= 90 MM HG: CPT | Performed by: FAMILY MEDICINE

## 2023-06-21 PROCEDURE — 0241U POCT CEPHEID COV-2, FLU A/B, RSV - PCR: CPT | Performed by: FAMILY MEDICINE

## 2023-06-21 PROCEDURE — 3075F SYST BP GE 130 - 139MM HG: CPT | Performed by: FAMILY MEDICINE

## 2023-06-21 RX ORDER — POLYMYXIN B SULFATE AND TRIMETHOPRIM 1; 10000 MG/ML; [USP'U]/ML
1 SOLUTION OPHTHALMIC 4 TIMES DAILY
Qty: 10 ML | Refills: 0 | Status: SHIPPED | OUTPATIENT
Start: 2023-06-21 | End: 2023-06-26

## 2023-06-21 ASSESSMENT — ENCOUNTER SYMPTOMS
SORE THROAT: 1
MYALGIAS: 1
COUGH: 1
EYE DISCHARGE: 1

## 2023-06-21 NOTE — PROGRESS NOTES
"Subjective     Chris Barr is a 58 y.o. male who presents with Nasal Congestion (X2 days), Eye Drainage, Cough, Body Aches, and Pharyngitis (X3 days )      - This is a pleasant and nontoxic appearing 58 y.o. person who has come to the walk-in clinic today for:    #1)       ALLERGIES:  Patient has no known allergies.     PMH:  Past Medical History:   Diagnosis Date    Anxiety 9/19/2013    Arthritis     spine    Colon polyps 2001, 9/09, 10/12    Tubular Adenoma    Dyslipidemia 3/31/2017    Migraine     Neck pain 6/26/2015    Spinal stenosis     L2-L3        PSH:  Past Surgical History:   Procedure Laterality Date    UMBILICAL HERNIA REPAIR  6/28/2017    Procedure: UMBILICAL HERNIA REPAIR WITH MESH;  Surgeon: Juan Campos M.D.;  Location: SURGERY [facility];  Service:     INGUINAL HERNIA REPAIR  9/26/2012    Performed by Juan Campos M.D. at SURGERY [facility]    OTHER NEUROLOGICAL SURG  2011    \"nerve ablation\"    COLONOSCOPY  9/09    VASECTOMY  2004    SHOULDER SURGERY  1984    left       MEDS:  No current outpatient medications on file.    ** I have documented what I find to be significant in regards to past medical, social, family and surgical history  in my HPI or under PMH/PSH/FH review section, otherwise it is noncontributory **         HPI    Review of Systems   All other systems reviewed and are negative.             Objective     There were no vitals taken for this visit.     Physical Exam  Vitals and nursing note reviewed.   Constitutional:       General: He is not in acute distress.     Appearance: Normal appearance. He is well-developed.   HENT:      Head: Normocephalic.   Cardiovascular:      Heart sounds: Normal heart sounds. No murmur heard.  Pulmonary:      Effort: Pulmonary effort is normal. No respiratory distress.      Breath sounds: Normal breath sounds.   Neurological:      Mental Status: He is alert.      Motor: No abnormal muscle tone.   Psychiatric:         Mood and " Affect: Mood normal.         Behavior: Behavior normal.                             Assessment & Plan     No diagnosis found.    - Dx, plan & d/c instructions discussed   - Rest, stay hydrated, OTC Motrin and/or Tylenol as needed  - E.R. precautions discussed     Follow up with your regular primary care providers office for a recheck on today's visit. ER if not improving in 2-3 days or if feeling/getting worse. (If you do not have a primary care provider and need to schedule one you may call [facility] at [phone] to do this).    Any realistic side effects of medications that may have been given today reviewed.     Patient left in stable condition     POCT results reviewed/discussed    Today's radiology imaging personally reviewed by me today on day of visit and Radiology readings reviewed and discussed w/ patient today.      reviewed if narcotics given     Pertinent prior lab work and/or imaging studies in Epic have been reviewed by me today on day of this visit.      Pertinent prior office visit notes in [facility] have been reviewed by me today on day of this visit.

## 2023-06-21 NOTE — PROGRESS NOTES
"Subjective     Chris Barr is a 58 y.o. male who presents with Nasal Congestion (X2 days), Eye Drainage, Cough, Body Aches, and Pharyngitis (X3 days )      - This is a pleasant and nontoxic appearing 58 y.o. person who has come to the walk-in clinic today for:    #1) ~3 days w/ some sore throat, cough, stuffy nose, aches/malaise, sinus headache and then in past day Lt eye red crusty (no trauma, vision change or use of contact lenses).       ALLERGIES:  Patient has no known allergies.     PMH:  Past Medical History:   Diagnosis Date    Anxiety 9/19/2013    Arthritis     spine    Colon polyps 2001, 9/09, 10/12    Tubular Adenoma    Dyslipidemia 3/31/2017    Migraine     Neck pain 6/26/2015    Spinal stenosis     L2-L3        PSH:  Past Surgical History:   Procedure Laterality Date    UMBILICAL HERNIA REPAIR  6/28/2017    Procedure: UMBILICAL HERNIA REPAIR WITH MESH;  Surgeon: Juan Campos M.D.;  Location: [facility];  Service:     INGUINAL HERNIA REPAIR  9/26/2012    Performed by Juan Campos M.D. at SURGERY [facility]    OTHER NEUROLOGICAL SURG  2011    \"nerve ablation\"    COLONOSCOPY  9/09    VASECTOMY  2004    SHOULDER SURGERY  1984    left       MEDS:    Current Outpatient Medications:     polymixin-trimethoprim (POLYTRIM) 89962-2.1 UNIT/ML-% Solution, Administer 1 Drop into both eyes 4 times a day for 5 days., Disp: 10 mL, Rfl: 0    ** I have documented what I find to be significant in regards to past medical, social, family and surgical history  in my HPI or under PMH/PSH/FH review section, otherwise it is noncontributory **         HPI    Review of Systems   HENT:  Positive for congestion and sore throat.    Eyes:  Positive for discharge.   Respiratory:  Positive for cough.    Musculoskeletal:  Positive for myalgias.   All other systems reviewed and are negative.             Objective     BP (!) 138/94 (BP Location: Left arm, Patient Position: Sitting, BP Cuff Size: Large " "adult)   Pulse (!) 102   Temp 36.7 °C (98 °F) (Temporal)   Resp 12   Ht 1.93 m (6' 4\")   Wt (!) 138 kg (305 lb)   SpO2 96%   BMI 37.13 kg/m²      Physical Exam  Vitals and nursing note reviewed.   Constitutional:       General: He is not in acute distress.     Appearance: Normal appearance. He is well-developed.   HENT:      Head: Normocephalic.      Mouth/Throat:      Mouth: Mucous membranes are moist.      Pharynx: Oropharynx is clear.   Eyes:        Comments: Lt eye:      Injection, some clear dc and a little lid crusting/dc. No obvious fb/abrasions. No photophobia. EOMI. Anterior chamber appears unremarkable    Cardiovascular:      Heart sounds: Normal heart sounds. No murmur heard.  Pulmonary:      Effort: Pulmonary effort is normal. No respiratory distress.      Breath sounds: Normal breath sounds.   Neurological:      Mental Status: He is alert.      Motor: No abnormal muscle tone.   Psychiatric:         Mood and Affect: Mood normal.         Behavior: Behavior normal.                             Assessment & Plan     1. Viral URI with cough  POCT CEPHEID COV-2, FLU A/B, RSV - PCR      2. Pink eye disease of left eye  polymixin-trimethoprim (POLYTRIM) 23958-2.1 UNIT/ML-% Solution          - Dx, plan & d/c instructions discussed   - Rest, stay hydrated, OTC Motrin and/or Tylenol as needed      Follow up with your regular primary care providers office for a recheck on today's visit. ER if not improving in 2-3 days or if feeling/getting worse.     Any realistic side effects of medications that may have been given today reviewed.     Patient left in stable condition     POCT results reviewed/discussed      Pertinent prior office visit notes in [employer] have been reviewed by me today on day of this visit.       "

## 2023-06-24 ENCOUNTER — OFFICE VISIT (OUTPATIENT)
Dept: URGENT CARE | Facility: CLINIC | Age: 58
End: 2023-06-24
Payer: COMMERCIAL

## 2023-06-24 VITALS
OXYGEN SATURATION: 96 % | TEMPERATURE: 98.3 F | HEIGHT: 76 IN | RESPIRATION RATE: 18 BRPM | DIASTOLIC BLOOD PRESSURE: 94 MMHG | HEART RATE: 105 BPM | SYSTOLIC BLOOD PRESSURE: 128 MMHG | WEIGHT: 305 LBS | BODY MASS INDEX: 37.14 KG/M2

## 2023-06-24 DIAGNOSIS — H66.002 NON-RECURRENT ACUTE SUPPURATIVE OTITIS MEDIA OF LEFT EAR WITHOUT SPONTANEOUS RUPTURE OF TYMPANIC MEMBRANE: ICD-10-CM

## 2023-06-24 DIAGNOSIS — R03.0 ELEVATED BLOOD PRESSURE READING IN OFFICE WITHOUT DIAGNOSIS OF HYPERTENSION: ICD-10-CM

## 2023-06-24 PROCEDURE — 99214 OFFICE O/P EST MOD 30 MIN: CPT | Performed by: NURSE PRACTITIONER

## 2023-06-24 PROCEDURE — 3080F DIAST BP >= 90 MM HG: CPT | Performed by: NURSE PRACTITIONER

## 2023-06-24 PROCEDURE — 3074F SYST BP LT 130 MM HG: CPT | Performed by: NURSE PRACTITIONER

## 2023-06-24 RX ORDER — AMOXICILLIN AND CLAVULANATE POTASSIUM 875; 125 MG/1; MG/1
1 TABLET, FILM COATED ORAL 2 TIMES DAILY
Qty: 20 TABLET | Refills: 0 | Status: SHIPPED | OUTPATIENT
Start: 2023-06-24 | End: 2023-07-04

## 2023-06-24 NOTE — PROGRESS NOTES
"Patient has consented to treatment and for use of patient information for treatment and billing purposes.    Date: 06/24/23     Arrival Mode: Private Vehicle / Ambulatory    Chief Complaint:    Chief Complaint   Patient presents with    Sore Throat     Cough, SOB, worsening since previous visit on 6/21, red eyes         History of Present Illness: 58 y.o. male  presents to clinic with 1 week of sinus congestion pain pressure postnasal drip causing a cough with coughing fits which then results in shortness of breath.  He reports continued sore throat with painful swallowing.  He states that his red eyes are improving although there is still some drainage when he wakes in the morning.  He is using the Polytrim as prescribed.  He states he began having significant bilateral ear pain today.  He is using over-the-counter cold medication to help manage symptoms states it is mildly helpful.  He denies severe shortness of breath chest pain or leg swelling.  No nausea vomiting diarrhea.      ROS:  As stated in HPI     Pertinent Medical History:    Past Medical History:   Diagnosis Date    Anxiety 9/19/2013    Arthritis     spine    Colon polyps 2001, 9/09, 10/12    Tubular Adenoma    Dyslipidemia 3/31/2017    Migraine     Neck pain 6/26/2015    Spinal stenosis     L2-L3        Pertinent Surgical History:    Past Surgical History:   Procedure Laterality Date    UMBILICAL HERNIA REPAIR  6/28/2017    Procedure: UMBILICAL HERNIA REPAIR WITH MESH;  Surgeon: Juan Campos M.D.;  Location: [facility];  Service:     INGUINAL HERNIA REPAIR  9/26/2012    Performed by Juan Campos M.D. at [facility]    OTHER NEUROLOGICAL SURG  2011    \"nerve ablation\"    COLONOSCOPY  9/09    VASECTOMY  2004    SHOULDER SURGERY  1984    left        Current  Medications:  Current Outpatient Medications on File Prior to Visit   Medication Sig Dispense Refill    polymixin-trimethoprim (POLYTRIM) 93905-5.1 UNIT/ML-% " Solution Administer 1 Drop into both eyes 4 times a day for 5 days. 10 mL 0     No current facility-administered medications on file prior to visit.        Allergies:     Patient has no known allergies.     Social History:   Social History     Socioeconomic History    Marital status:      Spouse name: Not on file    Number of children: 2    Years of education: Not on file    Highest education level: Not on file   Occupational History    Occupation: OT     Employer: [employer]   Tobacco Use    Smoking status: Never    Smokeless tobacco: Never   Substance and Sexual Activity    Alcohol use: No    Drug use: No    Sexual activity: Yes     Partners: Female     Birth control/protection: Surgical     Comment: , three children (1 ) wife with colon CA   Other Topics Concern    Not on file   Social History Narrative    Works full time as occupational therapist      Social Determinants of Health     Financial Resource Strain: Not on file   Food Insecurity: Not on file   Transportation Needs: Not on file   Physical Activity: Not on file   Stress: Not on file   Social Connections: Not on file   Intimate Partner Violence: Not on file   Housing Stability: Not on file        No LMP for male patient.       Physical Exam:  Vitals:    23 1448   BP: (!) 128/94   Pulse: (!) 118   Resp: 18   Temp: 36.8 °C (98.3 °F)   SpO2: 96%        Physical Exam  Constitutional:       General: He is awake.      Appearance: Normal appearance. He is not ill-appearing, toxic-appearing or diaphoretic.   HENT:      Head: Normocephalic and atraumatic.      Right Ear: Ear canal and external ear normal. Tympanic membrane is erythematous and bulging.      Left Ear: Ear canal and external ear normal. Tympanic membrane is erythematous and bulging.      Ears:      Comments: Bilateral TMs are dull     Nose: Rhinorrhea present. Rhinorrhea is clear.      Right Sinus: Maxillary sinus tenderness and frontal sinus tenderness present.      Left  Sinus: Maxillary sinus tenderness and frontal sinus tenderness present.      Mouth/Throat:      Lips: Pink.      Mouth: Mucous membranes are moist.      Tongue: No lesions.      Palate: No lesions.      Pharynx: Posterior oropharyngeal erythema present. No oropharyngeal exudate or uvula swelling.      Tonsils: No tonsillar exudate or tonsillar abscesses.   Eyes:      General: Lids are normal. Gaze aligned appropriately. No allergic shiner or scleral icterus.     Extraocular Movements: Extraocular movements intact.      Conjunctiva/sclera: Conjunctivae normal.      Pupils: Pupils are equal, round, and reactive to light.   Cardiovascular:      Rate and Rhythm: Normal rate and regular rhythm.      Pulses:           Radial pulses are 2+ on the right side and 2+ on the left side.      Heart sounds: Normal heart sounds.   Pulmonary:      Effort: Pulmonary effort is normal.      Breath sounds: Normal breath sounds and air entry. No decreased breath sounds, wheezing, rhonchi or rales.   Abdominal:      General: Abdomen is flat. Bowel sounds are normal.      Palpations: Abdomen is soft.      Tenderness: There is no abdominal tenderness.   Musculoskeletal:      Right lower leg: No edema.      Left lower leg: No edema.   Lymphadenopathy:      Cervical: No cervical adenopathy.   Skin:     General: Skin is warm.      Capillary Refill: Capillary refill takes less than 2 seconds.      Coloration: Skin is not cyanotic or pale.   Neurological:      Mental Status: He is alert and oriented to person, place, and time.      Gait: Gait is intact.   Psychiatric:         Behavior: Behavior normal. Behavior is cooperative.          Diagnostics:    None     Medical Decision Making:  I personally reviewed prior external notes and test results pertinent to today's visit.   Shared decision-making was utilized with patient did develop treatment plan and clinic course. Pt is clinically stable at today's acute urgent care visit.  No acute distress  noted. Appropriate for outpatient care at this time. Jen, 58 y.o. male who appears nontoxic on exam presenting clinic with 1 week history of upper respiratory tract congestion.  Patient was seen 3 days ago diagnosed with viral URI as well as bacterial conjunctivitis.  On exam eyes do appear to be improving very minimal conjunctival injection.  Advised to continue Polytrim.  On exam patient does have otitis media of the left ear with erythematous bulging dull tympanic membrane.  Right ear is opaque although dull and pink.  Did send for Augmentin at this time.  Fortunately lung sounds are clear vital signs are stable although patient does present with systemic symptoms of tachycardia secondary to illness.    Did advise patient on conservative measures for management of symptoms.  Patient is agreeable to pursue adequate rest, adequate hydration, saltwater gargle and Neti pot for any symptoms of upper respiratory congestion.  Over-the-counter analgesia and antipyretics on a p.r.n. basis as needed for pain and fever.  Did discuss over-the-counter cough medications.      Patient will monitor symptoms closely for worsening and is advised to seek further evaluation the emergency room if alarm signs or symptoms arise.  Patient states understanding and verbalizes agreement with this plan of care.    Plan:  1. Non-recurrent acute suppurative otitis media of left ear without spontaneous rupture of tympanic membrane    - amoxicillin-clavulanate (AUGMENTIN) 875-125 MG Tab; Take 1 Tablet by mouth 2 times a day for 10 days.  Dispense: 20 Tablet; Refill: 0    2. Elevated blood pressure reading in office without diagnosis of hypertension    - Referral back to [facility]CP     Disposition:  Patient was discharged in stable condition.    Voice Recognition Disclaimer: Portions of this document were created using voice recognition software. The software does have a chance of producing errors of grammar and possibly content. I have  made every reasonable attempt to correct obvious errors, but there may be errors of grammar and possibly content that I did not discover before finalizing the documentation.    Dulce Alfred, [email or web address].

## 2023-09-28 ENCOUNTER — IMMUNIZATION (OUTPATIENT)
Dept: OCCUPATIONAL MEDICINE | Facility: CLINIC | Age: 58
End: 2023-09-28

## 2023-09-28 DIAGNOSIS — Z23 NEED FOR VACCINATION: Primary | ICD-10-CM

## 2023-09-28 PROCEDURE — 90686 IIV4 VACC NO PRSV 0.5 ML IM: CPT | Performed by: PREVENTIVE MEDICINE

## 2024-09-26 ENCOUNTER — APPOINTMENT (OUTPATIENT)
Dept: OCCUPATIONAL MEDICINE | Facility: CLINIC | Age: 59
End: 2024-09-26

## 2024-09-26 DIAGNOSIS — Z23 NEED FOR VACCINATION: Primary | ICD-10-CM

## 2024-09-26 PROCEDURE — 90662 IIV NO PRSV INCREASED AG IM: CPT | Performed by: PREVENTIVE MEDICINE

## 2025-02-11 ENCOUNTER — EH NON-PROVIDER (OUTPATIENT)
Dept: OCCUPATIONAL MEDICINE | Facility: CLINIC | Age: 60
End: 2025-02-11

## 2025-02-24 SDOH — ECONOMIC STABILITY: FOOD INSECURITY: WITHIN THE PAST 12 MONTHS, YOU WORRIED THAT YOUR FOOD WOULD RUN OUT BEFORE YOU GOT MONEY TO BUY MORE.: NEVER TRUE

## 2025-02-24 SDOH — HEALTH STABILITY: PHYSICAL HEALTH: ON AVERAGE, HOW MANY DAYS PER WEEK DO YOU ENGAGE IN MODERATE TO STRENUOUS EXERCISE (LIKE A BRISK WALK)?: 2 DAYS

## 2025-02-24 SDOH — ECONOMIC STABILITY: INCOME INSECURITY: IN THE LAST 12 MONTHS, WAS THERE A TIME WHEN YOU WERE NOT ABLE TO PAY THE MORTGAGE OR RENT ON TIME?: NO

## 2025-02-24 SDOH — ECONOMIC STABILITY: INCOME INSECURITY: HOW HARD IS IT FOR YOU TO PAY FOR THE VERY BASICS LIKE FOOD, HOUSING, MEDICAL CARE, AND HEATING?: NOT HARD AT ALL

## 2025-02-24 SDOH — ECONOMIC STABILITY: HOUSING INSECURITY
IN THE LAST 12 MONTHS, WAS THERE A TIME WHEN YOU DID NOT HAVE A STEADY PLACE TO SLEEP OR SLEPT IN A SHELTER (INCLUDING NOW)?: NO

## 2025-02-24 SDOH — HEALTH STABILITY: PHYSICAL HEALTH: ON AVERAGE, HOW MANY MINUTES DO YOU ENGAGE IN EXERCISE AT THIS LEVEL?: 20 MIN

## 2025-02-24 SDOH — HEALTH STABILITY: MENTAL HEALTH
STRESS IS WHEN SOMEONE FEELS TENSE, NERVOUS, ANXIOUS, OR CAN'T SLEEP AT NIGHT BECAUSE THEIR MIND IS TROUBLED. HOW STRESSED ARE YOU?: ONLY A LITTLE

## 2025-02-24 SDOH — ECONOMIC STABILITY: FOOD INSECURITY: WITHIN THE PAST 12 MONTHS, THE FOOD YOU BOUGHT JUST DIDN'T LAST AND YOU DIDN'T HAVE MONEY TO GET MORE.: NEVER TRUE

## 2025-02-24 SDOH — ECONOMIC STABILITY: TRANSPORTATION INSECURITY
IN THE PAST 12 MONTHS, HAS LACK OF TRANSPORTATION KEPT YOU FROM MEETINGS, WORK, OR FROM GETTING THINGS NEEDED FOR DAILY LIVING?: NO

## 2025-02-24 SDOH — ECONOMIC STABILITY: TRANSPORTATION INSECURITY
IN THE PAST 12 MONTHS, HAS LACK OF RELIABLE TRANSPORTATION KEPT YOU FROM MEDICAL APPOINTMENTS, MEETINGS, WORK OR FROM GETTING THINGS NEEDED FOR DAILY LIVING?: NO

## 2025-02-24 SDOH — ECONOMIC STABILITY: TRANSPORTATION INSECURITY
IN THE PAST 12 MONTHS, HAS THE LACK OF TRANSPORTATION KEPT YOU FROM MEDICAL APPOINTMENTS OR FROM GETTING MEDICATIONS?: NO

## 2025-02-24 ASSESSMENT — SOCIAL DETERMINANTS OF HEALTH (SDOH)
HOW MANY DRINKS CONTAINING ALCOHOL DO YOU HAVE ON A TYPICAL DAY WHEN YOU ARE DRINKING: PATIENT DOES NOT DRINK
DO YOU BELONG TO ANY CLUBS OR ORGANIZATIONS SUCH AS CHURCH GROUPS UNIONS, FRATERNAL OR ATHLETIC GROUPS, OR SCHOOL GROUPS?: NO
IN A TYPICAL WEEK, HOW MANY TIMES DO YOU TALK ON THE PHONE WITH FAMILY, FRIENDS, OR NEIGHBORS?: NEVER
HOW OFTEN DO YOU GET TOGETHER WITH FRIENDS OR RELATIVES?: MORE THAN THREE TIMES A WEEK
HOW OFTEN DO YOU ATTEND CHURCH OR RELIGIOUS SERVICES?: NEVER
HOW OFTEN DO YOU HAVE A DRINK CONTAINING ALCOHOL: NEVER
HOW OFTEN DO YOU ATTEND CHURCH OR RELIGIOUS SERVICES?: NEVER
IN A TYPICAL WEEK, HOW MANY TIMES DO YOU TALK ON THE PHONE WITH FAMILY, FRIENDS, OR NEIGHBORS?: NEVER
DO YOU BELONG TO ANY CLUBS OR ORGANIZATIONS SUCH AS CHURCH GROUPS UNIONS, FRATERNAL OR ATHLETIC GROUPS, OR SCHOOL GROUPS?: NO
HOW HARD IS IT FOR YOU TO PAY FOR THE VERY BASICS LIKE FOOD, HOUSING, MEDICAL CARE, AND HEATING?: NOT HARD AT ALL
HOW OFTEN DO YOU ATTENT MEETINGS OF THE CLUB OR ORGANIZATION YOU BELONG TO?: NEVER
IN THE PAST 12 MONTHS, HAS THE ELECTRIC, GAS, OIL, OR WATER COMPANY THREATENED TO SHUT OFF SERVICE IN YOUR HOME?: NO
HOW OFTEN DO YOU ATTENT MEETINGS OF THE CLUB OR ORGANIZATION YOU BELONG TO?: NEVER
HOW OFTEN DO YOU GET TOGETHER WITH FRIENDS OR RELATIVES?: MORE THAN THREE TIMES A WEEK
WITHIN THE PAST 12 MONTHS, YOU WORRIED THAT YOUR FOOD WOULD RUN OUT BEFORE YOU GOT THE MONEY TO BUY MORE: NEVER TRUE
HOW OFTEN DO YOU HAVE SIX OR MORE DRINKS ON ONE OCCASION: NEVER

## 2025-02-24 ASSESSMENT — LIFESTYLE VARIABLES
AUDIT-C TOTAL SCORE: 0
SKIP TO QUESTIONS 9-10: 1
HOW OFTEN DO YOU HAVE A DRINK CONTAINING ALCOHOL: NEVER
HOW OFTEN DO YOU HAVE SIX OR MORE DRINKS ON ONE OCCASION: NEVER
HOW MANY STANDARD DRINKS CONTAINING ALCOHOL DO YOU HAVE ON A TYPICAL DAY: PATIENT DOES NOT DRINK

## 2025-02-25 ENCOUNTER — OFFICE VISIT (OUTPATIENT)
Dept: MEDICAL GROUP | Facility: CLINIC | Age: 60
End: 2025-02-25
Payer: COMMERCIAL

## 2025-02-25 VITALS
SYSTOLIC BLOOD PRESSURE: 124 MMHG | WEIGHT: 315 LBS | BODY MASS INDEX: 39.17 KG/M2 | HEIGHT: 75 IN | DIASTOLIC BLOOD PRESSURE: 98 MMHG

## 2025-02-25 DIAGNOSIS — Z76.89 ENCOUNTER TO ESTABLISH CARE: ICD-10-CM

## 2025-02-25 DIAGNOSIS — Z13.31 POSITIVE DEPRESSION SCREENING: ICD-10-CM

## 2025-02-25 DIAGNOSIS — Z80.0 FAMILY HISTORY OF COLON CANCER: ICD-10-CM

## 2025-02-25 DIAGNOSIS — R51.9 SINUS HEADACHE: ICD-10-CM

## 2025-02-25 DIAGNOSIS — R06.02 SHORTNESS OF BREATH: ICD-10-CM

## 2025-02-25 DIAGNOSIS — E66.9 OBESITY (BMI 30-39.9): ICD-10-CM

## 2025-02-25 DIAGNOSIS — R06.83 SNORING: ICD-10-CM

## 2025-02-25 ASSESSMENT — PATIENT HEALTH QUESTIONNAIRE - PHQ9
5. POOR APPETITE OR OVEREATING: 3 - NEARLY EVERY DAY
SUM OF ALL RESPONSES TO PHQ QUESTIONS 1-9: 12
CLINICAL INTERPRETATION OF PHQ2 SCORE: 3

## 2025-02-25 NOTE — PROGRESS NOTES
"  [facility]    Subjective:     CC: Establish care    HPI:   Chris is a 60 y.o. male with:    Problem   Shortness of Breath   Positive Depression Screening   Snoring   Family History of Colon Cancer   Encounter to Establish Care    Presents to clinic to establish care, has not seen a doctor in over 3 years. Brother is having heart problems and he thought he should get checked out. Wife is a nurse  Concerned that he is having shortness of breath with exertion.  He needs to go up 2 flights of stairs at work and finds that he is winded after going up the stairs.    Also feels winded walking around his property.  Denies any recent fever, chills, pain with inspiration, wheezing, chest pain, abdominal pain or lower extremity edema.  Feeling tired and fatigued.  Difficulty sleeping.  Poor diet, limited exercise  History of chronic sinus pressure resulting in severe headache 1-2 times per month.    Not currently on any allergy medication.  History of snoring, has never had a sleep study.  Chronic neck and back pain followed by the [facility] and [place] for pain management.  Family history of colon cancer.  Last colonoscopy 10 years ago, needs repeat, would like a referral.  Denies any current drinking, smoking or drug use.         No current [facility]-ordered outpatient medications on file.     No current [facility]-ordered facility-administered medications on file.     ROS:  Negative except as noted in HPI    Objective:     Exam:  BP (!) 124/98 (BP Location: Right arm, Patient Position: Sitting, BP Cuff Size: Large adult)   Ht 1.9 m (6' 2.8\")   Wt (!) 143 kg (316 lb 1.6 oz) Comment: shoes on  BMI 39.72 kg/m²  Body mass index is 39.72 kg/m².    Physical Exam  Constitutional:       General: He is not in acute distress.     Appearance: He is obese. He is not ill-appearing or diaphoretic.   HENT:      Mouth/Throat:      Mouth: Mucous membranes are moist.   Eyes:      Extraocular Movements: Extraocular movements intact.      " Pupils: Pupils are equal, round, and reactive to light.   Neck:      Comments: Neck >40 cm  Cardiovascular:      Rate and Rhythm: Normal rate and regular rhythm.      Heart sounds: Normal heart sounds. No murmur heard.  Pulmonary:      Effort: Pulmonary effort is normal. No respiratory distress.      Breath sounds: Normal breath sounds. No wheezing.   Musculoskeletal:      Right lower leg: No edema.      Left lower leg: No edema.   Skin:     General: Skin is warm.   Neurological:      General: No focal deficit present.      Mental Status: He is alert and oriented to person, place, and time.   Psychiatric:         Behavior: Behavior normal.       Depression Screening    Little interest or pleasure in doing things?  3 - nearly every day  Feeling down, depressed , or hopeless? 0 - not at all  Trouble falling or staying asleep, or sleeping too much?  3 - nearly every day  Feeling tired or having little energy?  3 - nearly every day  Poor appetite or overeating?  3 - nearly every day  Feeling bad about yourself - or that you are a failure or have let yourself or your family down? 0 - not at all  Trouble concentrating on things, such as reading the newspaper or watching television? 0 - not at all  Moving or speaking so slowly that other people could have noticed.  Or the opposite - being so fidgety or restless that you have been moving around a lot more than usual?  0 - not at all  Thoughts that you would be better off dead, or of hurting yourself?  0 - not at all  Patient Health Questionnaire Score: 12    If depressive symptoms identified deferred to follow up visit unless specifically addressed in assesment and plan.    Interpretation of PHQ-9 Total Score   Score Severity   1-4 No Depression   5-9 Mild Depression   10-14 Moderate Depression   15-19 Moderately Severe Depression   20-27 Severe Depression    Labs:     Results for orders placed or performed in visit on 06/21/23   POCT CEPHEID COV-2, FLU A/B, RSV - PCR     Collection Time: 06/21/23 10:03 AM   Result Value Ref Range    SARS-CoV-2 by PCR Negative Negative, Invalid    Influenza virus A RNA Negative Negative, Invalid    Influenza virus B, PCR Negative Negative, Invalid    RSV, PCR Negative Negative, Invalid       Assessment & Plan:     60 y.o. male with the following -     Problem List Items Addressed This Visit       Sinus headache    Obesity (BMI 30-39.9)    Relevant Orders    CBC WITH DIFFERENTIAL    Comp Metabolic Panel    Lipid Profile    HEMOGLOBIN A1C    Snoring    Relevant Orders    Referral to Pulmonary and Sleep Medicine    Family history of colon cancer    Relevant Orders    Referral to GI for Colonoscopy    Encounter to establish care    60-year-old male /98 with BMI of 39  PMH significant for sinus headache, chronic neck and back pain.  No history of asthma.  Not currently on any medications  FMH significant for diabetes, CAD and colon cancer  Currently having shortness of breath with exertion.  Denies any chest pain or lower extremity edema.  Poor diet, limited exercise  STOP-BANG score of 7  PE: Neck circumference greater than 40 cm, cards regular rate and rhythm no murmur appreciated, pulm clear to auscultation bilaterally no wheezing or coarse breath sounds.  PHQ-9 score of 12 indicating moderate depression,with positive answers in regards to fatigue and sleep  Patient denies feeling depressed, denies SI/HI    Given patient's presentation concern for shortness of breath with exertion.  No history of asthma.  Poor diet with limited exercise indicating possible deconditioning.  Physical exam reassuring.    Plan  - Labs ordered: CBC, CMP, lipid panel, A1c  - Positive depression screen noted.  Patient denies feeling depressed.  Denies any SI/HI.  Will continue to monitor.  - Chest x-ray ordered.  - Encourage patient to take a daily 24-hour allergy medication, Flonase and use a humidifier at night to decrease sinus inflammation and decrease occurrences  of sinus headaches.  If patient continues to have headaches 1-2 times per month we will consider further workup and potential migraines.  - Stop bang score of 7.  PHQ-9 answers positive for feeling fatigued and difficulty sleeping.  Concern for FIOR.  Referral to sleep medicine for sleep study  - Family history of colon cancer.  Colon cancer screening 10 years ago.  Referral placed for colonoscopy  - Follow-up appointment in 1 month         Shortness of breath    Relevant Orders    DX-CHEST-2 VIEWS    Positive depression screening    Relevant Orders    Patient has been identified as having a positive depression screening. Appropriate orders and counseling have been given. (Completed)           No follow-ups on file.      I advised the patient that I will contact them with all lab, imaging, or procedure results within a week of having the test performed. If they have not received a message/call from our clinic during that expected time period, they are advised to contact our clinic to ensure that the labs/imaging studies were received by this office.     Yojana Begum MD  [facility]  PGY-3

## 2025-02-26 ENCOUNTER — HOSPITAL ENCOUNTER (OUTPATIENT)
Dept: LAB | Facility: MEDICAL CENTER | Age: 60
End: 2025-02-26
Payer: COMMERCIAL

## 2025-02-26 DIAGNOSIS — E66.9 OBESITY (BMI 30-39.9): ICD-10-CM

## 2025-02-26 PROBLEM — Z13.31 POSITIVE DEPRESSION SCREENING: Status: ACTIVE | Noted: 2025-02-26

## 2025-02-26 PROBLEM — R06.02 SHORTNESS OF BREATH: Status: ACTIVE | Noted: 2025-02-26

## 2025-02-26 LAB
BASOPHILS # BLD AUTO: 0.4 % (ref 0–1.8)
BASOPHILS # BLD: 0.04 K/UL (ref 0–0.12)
EOSINOPHIL # BLD AUTO: 0.21 K/UL (ref 0–0.51)
EOSINOPHIL NFR BLD: 2.3 % (ref 0–6.9)
ERYTHROCYTE [DISTWIDTH] IN BLOOD BY AUTOMATED COUNT: 41 FL (ref 35.9–50)
EST. AVERAGE GLUCOSE BLD GHB EST-MCNC: 140 MG/DL
HBA1C MFR BLD: 6.5 % (ref 4–5.6)
HCT VFR BLD AUTO: 49.5 % (ref 42–52)
HGB BLD-MCNC: 16.9 G/DL (ref 14–18)
IMM GRANULOCYTES # BLD AUTO: 0.04 K/UL (ref 0–0.11)
IMM GRANULOCYTES NFR BLD AUTO: 0.4 % (ref 0–0.9)
LYMPHOCYTES # BLD AUTO: 1.83 K/UL (ref 1–4.8)
LYMPHOCYTES NFR BLD: 19.8 % (ref 22–41)
MCH RBC QN AUTO: 29.9 PG (ref 27–33)
MCHC RBC AUTO-ENTMCNC: 34.1 G/DL (ref 32.3–36.5)
MCV RBC AUTO: 87.5 FL (ref 81.4–97.8)
MONOCYTES # BLD AUTO: 1.02 K/UL (ref 0–0.85)
MONOCYTES NFR BLD AUTO: 11 % (ref 0–13.4)
NEUTROPHILS # BLD AUTO: 6.11 K/UL (ref 1.82–7.42)
NEUTROPHILS NFR BLD: 66.1 % (ref 44–72)
NRBC # BLD AUTO: 0 K/UL
NRBC BLD-RTO: 0 /100 WBC (ref 0–0.2)
PLATELET # BLD AUTO: 199 K/UL (ref 164–446)
PMV BLD AUTO: 10.6 FL (ref 9–12.9)
RBC # BLD AUTO: 5.66 M/UL (ref 4.7–6.1)
WBC # BLD AUTO: 9.3 K/UL (ref 4.8–10.8)

## 2025-02-26 PROCEDURE — 85025 COMPLETE CBC W/AUTO DIFF WBC: CPT

## 2025-02-26 PROCEDURE — 36415 COLL VENOUS BLD VENIPUNCTURE: CPT

## 2025-02-26 PROCEDURE — 80053 COMPREHEN METABOLIC PANEL: CPT

## 2025-02-26 PROCEDURE — 83036 HEMOGLOBIN GLYCOSYLATED A1C: CPT

## 2025-02-26 PROCEDURE — 80061 LIPID PANEL: CPT

## 2025-02-26 NOTE — ASSESSMENT & PLAN NOTE
60-year-old male /98 with BMI of 39  PMH significant for sinus headache, chronic neck and back pain.  No history of asthma.  Not currently on any medications  FMH significant for diabetes, CAD and colon cancer  Currently having shortness of breath with exertion.  Denies any chest pain or lower extremity edema.  Poor diet, limited exercise  STOP-BANG score of 7  PE: Neck circumference greater than 40 cm, cards regular rate and rhythm no murmur appreciated, pulm clear to auscultation bilaterally no wheezing or coarse breath sounds.  PHQ-9 score of 12 indicating moderate depression,with positive answers in regards to fatigue and sleep  Patient denies feeling depressed, denies SI/HI    Given patient's presentation concern for shortness of breath with exertion.  No history of asthma.  Poor diet with limited exercise indicating possible deconditioning.  Physical exam reassuring.    Plan  - Labs ordered: CBC, CMP, lipid panel, A1c  - Positive depression screen noted.  Patient denies feeling depressed.  Denies any SI/HI.  Will continue to monitor.  - Chest x-ray ordered.  - Encourage patient to take a daily 24-hour allergy medication, Flonase and use a humidifier at night to decrease sinus inflammation and decrease occurrences of sinus headaches.  If patient continues to have headaches 1-2 times per month we will consider further workup and potential migraines.  - Stop bang score of 7.  PHQ-9 answers positive for feeling fatigued and difficulty sleeping.  Concern for FIOR.  Referral to sleep medicine for sleep study  - Family history of colon cancer.  Colon cancer screening 10 years ago.  Referral placed for colonoscopy  - Follow-up appointment in 1 month

## 2025-02-27 LAB
ALBUMIN SERPL BCP-MCNC: 4.3 G/DL (ref 3.2–4.9)
ALBUMIN/GLOB SERPL: 1.5 G/DL
ALP SERPL-CCNC: 87 U/L (ref 30–99)
ALT SERPL-CCNC: 44 U/L (ref 2–50)
ANION GAP SERPL CALC-SCNC: 13 MMOL/L (ref 7–16)
AST SERPL-CCNC: 35 U/L (ref 12–45)
BILIRUB SERPL-MCNC: 1.4 MG/DL (ref 0.1–1.5)
BUN SERPL-MCNC: 16 MG/DL (ref 8–22)
CALCIUM ALBUM COR SERPL-MCNC: 9 MG/DL (ref 8.5–10.5)
CALCIUM SERPL-MCNC: 9.2 MG/DL (ref 8.5–10.5)
CHLORIDE SERPL-SCNC: 101 MMOL/L (ref 96–112)
CHOLEST SERPL-MCNC: 123 MG/DL (ref 100–199)
CO2 SERPL-SCNC: 23 MMOL/L (ref 20–33)
CREAT SERPL-MCNC: 0.74 MG/DL (ref 0.5–1.4)
GFR SERPLBLD CREATININE-BSD FMLA CKD-EPI: 104 ML/MIN/1.73 M 2
GLOBULIN SER CALC-MCNC: 2.8 G/DL (ref 1.9–3.5)
GLUCOSE SERPL-MCNC: 109 MG/DL (ref 65–99)
HDLC SERPL-MCNC: 30 MG/DL
LDLC SERPL CALC-MCNC: 76 MG/DL
POTASSIUM SERPL-SCNC: 4 MMOL/L (ref 3.6–5.5)
PROT SERPL-MCNC: 7.1 G/DL (ref 6–8.2)
SODIUM SERPL-SCNC: 137 MMOL/L (ref 135–145)
TRIGL SERPL-MCNC: 85 MG/DL (ref 0–149)

## 2025-02-28 ENCOUNTER — RESULTS FOLLOW-UP (OUTPATIENT)
Dept: MEDICAL GROUP | Facility: CLINIC | Age: 60
End: 2025-02-28

## 2025-03-14 ENCOUNTER — APPOINTMENT (OUTPATIENT)
Dept: MEDICAL GROUP | Facility: CLINIC | Age: 60
End: 2025-03-14
Payer: COMMERCIAL

## 2025-03-14 VITALS
HEART RATE: 76 BPM | DIASTOLIC BLOOD PRESSURE: 91 MMHG | BODY MASS INDEX: 38.36 KG/M2 | TEMPERATURE: 97.1 F | SYSTOLIC BLOOD PRESSURE: 146 MMHG | HEIGHT: 76 IN | OXYGEN SATURATION: 97 % | WEIGHT: 315 LBS

## 2025-03-14 DIAGNOSIS — E11.9 TYPE 2 DIABETES MELLITUS WITHOUT COMPLICATION, WITHOUT LONG-TERM CURRENT USE OF INSULIN (HCC): ICD-10-CM

## 2025-03-14 PROCEDURE — 99213 OFFICE O/P EST LOW 20 MIN: CPT | Mod: GE

## 2025-03-14 PROCEDURE — 3080F DIAST BP >= 90 MM HG: CPT | Mod: GC

## 2025-03-14 PROCEDURE — 3077F SYST BP >= 140 MM HG: CPT | Mod: GC

## 2025-03-14 RX ORDER — METFORMIN HYDROCHLORIDE 500 MG/1
500 TABLET, EXTENDED RELEASE ORAL DAILY
Qty: 90 TABLET | Refills: 3 | Status: SHIPPED | OUTPATIENT
Start: 2025-03-14

## 2025-03-14 ASSESSMENT — FIBROSIS 4 INDEX: FIB4 SCORE: 1.56

## 2025-03-14 NOTE — ASSESSMENT & PLAN NOTE
60-year-old male, /91, BMI 38  A1c 6.5  Family history significant for diabetes  Reports eating a healthy diet and exercising weekly    Plan  - BP elevated at today's visit, within normal limits at last visit.  Will continue to monitor at next appointment in 90 days.  If continues to be elevated will consider adding antihypertensive.  - Lipid panel within normal limits will defer starting statin at this time  - Will start metformin  mg daily  - Referral to nutrition and diabetes education  - Encouraged patient to eat a diet rich in fruits and vegetables getting 150 minutes of moderate exercise weekly.  - Follow-up appointment in 90 days

## 2025-03-14 NOTE — PROGRESS NOTES
"  [facility]    Subjective:     CC: Follow-up on labs    HPI:   Chris is a 60 y.o. male with:    Problem   Type 2 Diabetes Mellitus Without Complication, Without Long-Term Current Use of Insulin (Hcc)    Patient presents to clinic to follow-up on abnormal A1c  Patient reports that he eats a relatively good diet but likes bread.  Works out weekly  Family history significant for diabetes  Denies any vision changes, shortness of breath, chest pain, abdominal pain, lower extremity edema or lower extremity neuropathy         Current Outpatient Medications Ordered in Epic   Medication Sig Dispense Refill    metFORMIN ER (GLUCOPHAGE XR) 500 MG TABLET SR 24 HR Take 1 Tablet by mouth every day. 90 Tablet 3     No current Epic-ordered facility-administered medications on file.     ROS:  Negative except as noted in HPI    Objective:     Exam:  BP (!) 146/91 (BP Location: Left arm, Patient Position: Sitting, BP Cuff Size: Large adult)   Pulse 76   Temp 36.2 °C (97.1 °F) (Temporal)   Ht 1.93 m (6' 4\")   Wt (!) 145 kg (319 lb)   SpO2 97%   BMI 38.83 kg/m²  Body mass index is 38.83 kg/m².    Physical Exam  Constitutional:       General: He is not in acute distress.     Appearance: He is obese. He is not ill-appearing.   HENT:      Mouth/Throat:      Mouth: Mucous membranes are moist.   Eyes:      Extraocular Movements: Extraocular movements intact.   Pulmonary:      Effort: Pulmonary effort is normal. No respiratory distress.   Abdominal:      General: There is no distension.      Palpations: Abdomen is soft.      Tenderness: There is no abdominal tenderness.   Musculoskeletal:         General: Normal range of motion.      Cervical back: Normal range of motion.   Neurological:      General: No focal deficit present.      Mental Status: He is alert and oriented to person, place, and time.   Psychiatric:         Mood and Affect: Mood normal.         Behavior: Behavior normal.       Labs:     Results for orders placed " or performed during the hospital encounter of 02/26/25   CBC WITH DIFFERENTIAL    Collection Time: 02/26/25  7:14 AM   Result Value Ref Range    WBC 9.3 4.8 - 10.8 K/uL    RBC 5.66 4.70 - 6.10 M/uL    Hemoglobin 16.9 14.0 - 18.0 g/dL    Hematocrit 49.5 42.0 - 52.0 %    MCV 87.5 81.4 - 97.8 fL    MCH 29.9 27.0 - 33.0 pg    MCHC 34.1 32.3 - 36.5 g/dL    RDW 41.0 35.9 - 50.0 fL    Platelet Count 199 164 - 446 K/uL    MPV 10.6 9.0 - 12.9 fL    Neutrophils-Polys 66.10 44.00 - 72.00 %    Lymphocytes 19.80 (L) 22.00 - 41.00 %    Monocytes 11.00 0.00 - 13.40 %    Eosinophils 2.30 0.00 - 6.90 %    Basophils 0.40 0.00 - 1.80 %    Immature Granulocytes 0.40 0.00 - 0.90 %    Nucleated RBC 0.00 0.00 - 0.20 /100 WBC    Neutrophils (Absolute) 6.11 1.82 - 7.42 K/uL    Lymphs (Absolute) 1.83 1.00 - 4.80 K/uL    Monos (Absolute) 1.02 (H) 0.00 - 0.85 K/uL    Eos (Absolute) 0.21 0.00 - 0.51 K/uL    Baso (Absolute) 0.04 0.00 - 0.12 K/uL    Immature Granulocytes (abs) 0.04 0.00 - 0.11 K/uL    NRBC (Absolute) 0.00 K/uL   Comp Metabolic Panel    Collection Time: 02/26/25  7:14 AM   Result Value Ref Range    Sodium 137 135 - 145 mmol/L    Potassium 4.0 3.6 - 5.5 mmol/L    Chloride 101 96 - 112 mmol/L    Co2 23 20 - 33 mmol/L    Anion Gap 13.0 7.0 - 16.0    Glucose 109 (H) 65 - 99 mg/dL    Bun 16 8 - 22 mg/dL    Creatinine 0.74 0.50 - 1.40 mg/dL    Calcium 9.2 8.5 - 10.5 mg/dL    Correct Calcium 9.0 8.5 - 10.5 mg/dL    AST(SGOT) 35 12 - 45 U/L    ALT(SGPT) 44 2 - 50 U/L    Alkaline Phosphatase 87 30 - 99 U/L    Total Bilirubin 1.4 0.1 - 1.5 mg/dL    Albumin 4.3 3.2 - 4.9 g/dL    Total Protein 7.1 6.0 - 8.2 g/dL    Globulin 2.8 1.9 - 3.5 g/dL    A-G Ratio 1.5 g/dL   Lipid Profile    Collection Time: 02/26/25  7:14 AM   Result Value Ref Range    Cholesterol,Tot 123 100 - 199 mg/dL    Triglycerides 85 0 - 149 mg/dL    HDL 30 (A) >=40 mg/dL    LDL 76 <100 mg/dL   HEMOGLOBIN A1C    Collection Time: 02/26/25  7:14 AM   Result Value Ref Range     Glycohemoglobin 6.5 (H) 4.0 - 5.6 %    Est Avg Glucose 140 mg/dL   ESTIMATED GFR    Collection Time: 02/26/25  7:14 AM   Result Value Ref Range    GFR (CKD-EPI) 104 >60 mL/min/1.73 m 2       Assessment & Plan:     60 y.o. male with the following -     Problem List Items Addressed This Visit       Type 2 diabetes mellitus without complication, without long-term current use of insulin (HCC)    60-year-old male, /91, BMI 38  A1c 6.5  Family history significant for diabetes  Reports eating a healthy diet and exercising weekly    Plan  - BP elevated at today's visit, within normal limits at last visit.  Will continue to monitor at next appointment in 90 days.  If continues to be elevated will consider adding antihypertensive.  - Lipid panel within normal limits will defer starting statin at this time  - Will start metformin  mg daily  - Referral to nutrition and diabetes education  - Encouraged patient to eat a diet rich in fruits and vegetables getting 150 minutes of moderate exercise weekly.  - Follow-up appointment in 90 days         Relevant Medications    metFORMIN ER (GLUCOPHAGE XR) 500 MG TABLET SR 24 HR    Other Relevant Orders    Referral to Diabetic Education    Referral to Nutrition Services       Return in about 3 months (around 6/14/2025).      I advised the patient that I will contact them with all lab, imaging, or procedure results within a week of having the test performed. If they have not received a message/call from our clinic during that expected time period, they are advised to contact our clinic to ensure that the labs/imaging studies were received by this office.     Yojana Bgeum MD  [facility]  PGY-3

## 2025-03-19 NOTE — Clinical Note
REFERRAL APPROVAL NOTICE         Sent on March 19, 2025                   Chris RESENDEZ Cortney  [address] [facility] [address]                   Dear Mr. Barr,    After a careful review of the medical information and benefit coverage, [facility] has processed your referral. See below for additional details.    If applicable, you must be actively enrolled with your insurance for coverage of the authorized service. If you have any questions regarding your coverage, please contact your insurance directly.    REFERRAL INFORMATION   Referral #:  [id number]  Referred-To Department    Referred-By Provider:  Jeff Lawson M.D.   [facility]      [address] [facility] [address]  [phone] [address] [facility] [id number] [phone]    Referral Start Date:  03/14/2025  Referral End Date:   03/14/2026             SCHEDULING  If you do not already have an appointment, please call [phone] to make an appointment.     MORE INFORMATION  If you do not already have a [facility] account, sign up at: [email or web address]  You can access your medical information, make appointments, see lab results, billing information, and more.  If you have questions regarding this referral, please contact  the [facility] Referrals department at:             [phone]. Monday - Friday 8:00AM - 5:00PM.     Sincerely,    [facility]

## 2025-03-19 NOTE — Clinical Note
REFERRAL APPROVAL NOTICE         Sent on March 19, 2025                   Chris RESENDEZ Cortney  [address] [facility] [address]                   Dear Mr. Barr,    After a careful review of the medical information and benefit coverage, [facility] has processed your referral. See below for additional details.    If applicable, you must be actively enrolled with your insurance for coverage of the authorized service. If you have any questions regarding your coverage, please contact your insurance directly.    REFERRAL INFORMATION   Referral #:  [id number]  Referred-To Department    Referred-By Provider:  Jeff Lawson M.D.   [facility]      [address] [facility] [address]  [phone] [address] [facility] [id number] [phone]    Referral Start Date:  03/14/2025  Referral End Date:   03/14/2026             SCHEDULING  If you do not already have an appointment, please call [phone] to make an appointment.     MORE INFORMATION  If you do not already have a [employer] account, sign up at: [email or web address]  You can access your medical information, make appointments, see lab results, billing information, and more.  If you have questions regarding this referral, please contact  the [facility] Referrals department at:             [phone]. Monday - Friday 8:00AM - 5:00PM.     Sincerely,    [facility]

## 2025-03-27 ENCOUNTER — HOSPITAL ENCOUNTER (OUTPATIENT)
Dept: RADIOLOGY | Facility: MEDICAL CENTER | Age: 60
End: 2025-03-27
Payer: COMMERCIAL

## 2025-03-27 DIAGNOSIS — R06.02 SHORTNESS OF BREATH: ICD-10-CM

## 2025-03-27 PROCEDURE — 71046 X-RAY EXAM CHEST 2 VIEWS: CPT

## 2025-05-12 ENCOUNTER — OFFICE VISIT (OUTPATIENT)
Dept: SLEEP MEDICINE | Facility: MEDICAL CENTER | Age: 60
End: 2025-05-12
Payer: COMMERCIAL

## 2025-05-12 VITALS
RESPIRATION RATE: 16 BRPM | HEIGHT: 76 IN | OXYGEN SATURATION: 93 % | BODY MASS INDEX: 37.14 KG/M2 | DIASTOLIC BLOOD PRESSURE: 78 MMHG | HEART RATE: 84 BPM | SYSTOLIC BLOOD PRESSURE: 124 MMHG | WEIGHT: 305 LBS

## 2025-05-12 DIAGNOSIS — E66.9 OBESITY (BMI 30-39.9): ICD-10-CM

## 2025-05-12 DIAGNOSIS — R06.81 WITNESSED EPISODE OF APNEA: ICD-10-CM

## 2025-05-12 DIAGNOSIS — R06.83 SNORING: ICD-10-CM

## 2025-05-12 DIAGNOSIS — G47.30 SLEEP DISORDER BREATHING: Primary | ICD-10-CM

## 2025-05-12 PROCEDURE — 99204 OFFICE O/P NEW MOD 45 MIN: CPT | Performed by: STUDENT IN AN ORGANIZED HEALTH CARE EDUCATION/TRAINING PROGRAM

## 2025-05-12 PROCEDURE — 99214 OFFICE O/P EST MOD 30 MIN: CPT | Performed by: STUDENT IN AN ORGANIZED HEALTH CARE EDUCATION/TRAINING PROGRAM

## 2025-05-12 ASSESSMENT — ENCOUNTER SYMPTOMS: TIME GOING TO BED: 10PM

## 2025-05-12 ASSESSMENT — FIBROSIS 4 INDEX: FIB4 SCORE: 1.56

## 2025-05-12 NOTE — PROGRESS NOTES
[facility] Consult Note     Date: 5/12/2025 / Time: 9:31 AM      Thank you for requesting a sleep medicine consultation on Chris Barr at the sleep center. Presents today with the chief complaints of snoring, afternoon fatigue, and witnessed apneas. [place] [address] for evaluation and treatment of sleep disorder breathing .     HISTORY OF PRESENT ILLNESS:     Chris Barr is a 60 y.o. male with obesity, type 2 diabetes mellitus, and snoring.  Presents to Sleep Clinic for evaluation of sleep.     He states for years he was told that he snores in his sleep and has interruptions to his breathing.  His wife notices him at night due to his pauses in breathing.  He was discussing this with his PCP and is recommended to be evaluated by sleep medicine for potential sleep apnea.    He generally has no trouble falling asleep.  He does awaken 3-4 times a night and sometimes has difficulty getting back to sleep.  His sleep is nonrestorative.  During the day he can have tiredness especially in the afternoon.    As per supplemental questionnaire to be scanned or imported into chart:    [place] Sleepiness Score: 5    Sleep Schedule  Bedtime: Weekday & Weekend 10pm   Wake time: Weekday 520 am Weekend 630am   Sleep-onset latency: 30-40 min   Awakenings from sleep: 3-4  Difficulty falling back asleep: sometimes   Bedroom partner: yes   Naps: No     DAYTIME SYMPTOMS:   Excessive daytime sleepiness: No   Daytime fatigue: afternoon   Difficulty concentrating: sometimes   Memory problems: No   Irritability:No   Work/school performance issues: No   Sleepiness with driving: sometimes   Caffeine/stimulant use: Yes  Alcohol use:No     SLEEP RELATED SYMPTOMS  Snoring: Yes  Witnessed apnea or gasping/choking: witnessed apneas   Dry mouth or mouth breathing: No   Night Sweating: No   Teeth grinding/biting: No   Morning headaches: Yes  Refreshed Upon Awakening: No  "     SLEEP RELATED BEHAVIORS:  Parasomnias (walking, talking, eating, violence): No   Leg kicking: No   Restless legs - \"urge to move\": No   Nightmares: No  Recurrent: No   Dream enactment: No      NARCOLEPSY:  Cataplexy: No   Sleep paralysis: No   Sleep attacks: No   Hypnagogic/hypnopompic hallucinations: No     MEDICAL HISTORY  Past Medical History:   Diagnosis Date    Anxiety 09/19/2013    Apnea, sleep     Arthritis     spine    Colon polyps 2001, 9/09, 10/12    Tubular Adenoma    Daytime sleepiness     Dyslipidemia 03/31/2017    Gasping for breath     Insomnia     Migraine     Neck pain 06/26/2015    Shortness of breath     Snoring     Spinal stenosis     L2-L3    Type 2 diabetes mellitus without complication, without long-term current use of insulin ([facility]) 03/14/2025        SURGICAL HISTORY  Past Surgical History:   Procedure Laterality Date    UMBILICAL HERNIA REPAIR  6/28/2017    Procedure: UMBILICAL HERNIA REPAIR WITH MESH;  Surgeon: Juan Campos M.D.;  Location: SURGERY [facility];  Service:     INGUINAL HERNIA REPAIR  9/26/2012    Performed by Juan Campos M.D. at SURGERY [facility]    OTHER NEUROLOGICAL SURG  2011    \"nerve ablation\"    COLONOSCOPY  9/09    VASECTOMY  2004    SHOULDER SURGERY  1984    left        FAMILY HISTORY  Family History   Problem Relation Age of Onset    Cancer Mother         Colon at 42    Cancer Father         lymphoma    Alcohol/Drug Father         alcohol    Alzheimer's Disease Father     Cancer Brother         colon    Cancer Maternal Grandmother         colon    Heart Disease Paternal Grandfather        SOCIAL HISTORY  Social History     Socioeconomic History    Marital status:     Number of children: 2    Highest education level: Master's degree (e.g., MA, MS, Isha, MEd, MSW, JEFF)   Occupational History    Occupation: OT     Employer: [employer]   Tobacco Use    Smoking status: Never    Smokeless tobacco: Never   Vaping Use    Vaping status: Never " Used   Substance and Sexual Activity    Alcohol use: No    Drug use: No    Sexual activity: Yes     Partners: Female     Birth control/protection: Surgical     Comment: , three children (1 ) wife with colon CA   Social History Narrative    Works full time as occupational therapist      Social Drivers of Health     Financial Resource Strain: Low Risk  (2025)    Overall Financial Resource Strain (CARDIA)     Difficulty of Paying Living Expenses: Not hard at all   Food Insecurity: No Food Insecurity (2025)    Hunger Vital Sign     Worried About Running Out of Food in the Last Year: Never true     Ran Out of Food in the Last Year: Never true   Transportation Needs: No Transportation Needs (2025)    PRAPARE - Transportation     Lack of Transportation (Medical): No     Lack of Transportation (Non-Medical): No   Physical Activity: Insufficiently Active (2025)    Exercise Vital Sign     Days of Exercise per Week: 2 days     Minutes of Exercise per Session: 20 min   Stress: No Stress Concern Present (2025)    [place] of Occupational Health - Occupational Stress Questionnaire     Feeling of Stress : Only a little   Social Connections: Moderately Isolated (2025)    Social Connection and Isolation Panel [NHANES]     Frequency of Communication with Friends and Family: Never     Frequency of Social Gatherings with Friends and Family: More than three times a week     Attends Taoist Services: Never     Active Member of Clubs or Organizations: No     Attends Club or Organization Meetings: Never     Marital Status:    Housing Stability: Low Risk  (2025)    Housing Stability Vital Sign     Unable to Pay for Housing in the Last Year: No     Number of Times Moved in the Last Year: 0     Homeless in the Last Year: No        Occupation: OT     CURRENT MEDICATIONS  Current Outpatient Medications   Medication Sig Dispense Refill    metFORMIN ER (GLUCOPHAGE XR) 500 MG  "TABLET SR 24 HR Take 1 Tablet by mouth every day. 90 Tablet 3     No current facility-administered medications for this visit.       REVIEW OF SYSTEMS  Constitutional: Denies fevers, Denies weight changes  Ears/Nose/Throat/Mouth: Denies nasal congestion or sore throat   Cardiovascular: Denies chest pain  Respiratory: Denies shortness of breath, Denies cough  Gastrointestinal/Hepatic: Denies nausea, vomiting  Sleep: see HPI    Physical Examination:  Vitals/ General Appearance:   Weight/BMI: Body mass index is 37.13 kg/m².  /78 (BP Location: Left arm, Patient Position: Sitting, BP Cuff Size: Large adult)   Pulse 84   Resp 16   Ht 1.93 m (6' 4\")   Wt (!) 138 kg (305 lb)   SpO2 93%   Vitals:    05/12/25 0928   BP: 124/78   BP Location: Left arm   Patient Position: Sitting   BP Cuff Size: Large adult   Pulse: 84   Resp: 16   SpO2: 93%   Weight: (!) 138 kg (305 lb)   Height: 1.93 m (6' 4\")       Pt. is alert and oriented to time, place and person. Cooperative and in no apparent distress.     Constitutional: Alert, no distress, well-groomed.  Skin: No rashes in visible areas.  Eye: Round. Conjunctiva clear, lids normal. No icterus.   ENT EXAM  Nasal alae/valves collapsible: No   Nasal septum deviation: Yes  Nasal turbinate hypertrophy: No   Hard palate narrow: Yes  Hard palate high: Yes  Soft palate/uvula (Mallampati score): 3  Tongue Scalloping: No   Retrognathia: No   Micrognathia: No   Cardiovascular:no murmus/gallops/rubs, normal S1 and S2 heart sounds, regular rate and rhythm  Pulmonary:Clear to auscultation, No wheezes, No crackles.  Neurologic:Awake, alert and oriented x 3, Normal age appropriate gait, No involuntary motions.  Extremities: No clubbing, cyanosis, or edema       ASSESSMENT AND PLAN   Chris Barr is a 60 y.o. male with obesity, type 2 diabetes mellitus, and snoring.  Presents to Sleep Clinic for evaluation of sleep.     Chris Barr  has symptoms of Obstructive Sleep Apnea (FIOR). Chris MAL " Cortney has symptoms of snoring,  witnessed apnea, dry mouth, morning headaches, unrefreshed upon awakening, fatigue. These can interfere with activities of daily living.     Pt has risk factors for FIOR include obesity, age, and crowded oropharynx.     The pathophysiology of FIOR and the increased risk of cardiovascular morbidity from untreated FIOR is discussed in detail with the patient. He  also has DM type 2  which can be worsened by FIOR.      We have discussed diagnostic options including in-laboratory, attended polysomnography and home sleep testing. We have also discussed treatment options including airway pressurization, reconstructive otolaryngologic surgery, dental appliances and weight management.     Subsequently,treatment options will be discussed based on the diagnostic study. Meanwhile, He is urged to avoid supine sleep.    Plan  -  He  will be scheduled for an overnight PSG to assess sleep related breathing disorder.  - Follow up 1-2 weeks after sleep study to discuss results and treatment options moving forward   -Advised to reach out via MyChart or by phone with any questions or concerns.     Please note portions of this record was created using voice recognition software. I have made every reasonable attempt to correct obvious errors, but I expect that there are errors of grammar and possibly content I did not discover before finalizing the note.

## 2025-05-28 ENCOUNTER — SLEEP STUDY (OUTPATIENT)
Dept: SLEEP MEDICINE | Facility: MEDICAL CENTER | Age: 60
End: 2025-05-28
Attending: STUDENT IN AN ORGANIZED HEALTH CARE EDUCATION/TRAINING PROGRAM
Payer: COMMERCIAL

## 2025-05-28 DIAGNOSIS — R06.83 SNORING: ICD-10-CM

## 2025-05-28 DIAGNOSIS — E66.9 OBESITY (BMI 30-39.9): ICD-10-CM

## 2025-05-28 DIAGNOSIS — R06.81 WITNESSED EPISODE OF APNEA: ICD-10-CM

## 2025-05-28 DIAGNOSIS — G47.30 SLEEP DISORDER BREATHING: ICD-10-CM

## 2025-05-29 NOTE — PROCEDURES
Patient: JEN CLINTON  ID: [id number] Date: 5/28/2025  MONTAGE: Standard  STUDY TYPE: Diagnostic  RECORDING TECHNIQUE:   After the scalp was prepared, gold plated electrodes were applied to the scalp according to the International 10-20 System. EEG (electroencephalogram) was continuously monitored from the O1-M2, O2-M1, C3-M2, C4-M1, F3-M2, and F4-M1. EOGs (electrooculograms) were monitored by electrodes placed at the left and right outer canthi. Chin EMG (electromyogram) was monitored by electrodes placed on the mentalis and sub-mentalis muscles. Nasal and oral airflow were monitored using a triple port thermocouple as well as oronasal pressure transducer. Respiratory effort was measured by inductive plethysmography technology employing abdominal and thoracic belts. Blood oxygen saturation and pulse were monitored by pulse oximetry. Heart rhythm was monitored by surface electrocardiogram. Leg EMG was studied using surface electrodes placed on left and right anterior tibialis. A microphone was used to monitor tracheal sounds and snoring. Body position was monitored and documented by technician observation.   SCORING CRITERIA:   A modification of the AASM manual for scoring of sleep and associated events was used. Obstructive apneas were scored by cessation of airflow for at least 10 seconds with continuing respiratory effort. Central apneas were scored by cessation of airflow for at least 10 seconds with no respiratory effort. Hypopneas were scored by a 30% or more reduction in airflow for at least 10 seconds accompanied by arterial oxygen desaturation of 3% or an arousal. For CMS (Medicare) patients, per AASM rule 1B, hypopneas are scored by 30% with mild reduction in airflow for at least 10 seconds accompanied by arterial saturation decreased at 4%.  Study start time was 10:16:27 PM. Diagnostic recording time was 7h 32.5m with a total sleep time of 4h 17.5m resulting in a sleep efficiency of 56.91%%. Sleep latency  from the start of the study was 11 minutes and the latency from sleep to REM was 260 minutes. In total,220 arousals were scored for an arousal index of 51.3.  Respiratory:  There were a total of 208 apneas consisting of 108 obstructive apneas, 0 mixed apneas, and 100 central apneas. A total of 160 hypopneas were scored. The apnea index was 48.47 per hour and the hypopnea index was 37.28 per hour resulting in an overall AHI of 85.75. AHI during REM was 44.3 and AHI while supine was 0.00.  27% of total events were central apneas.  Oximetry:  There was a mean oxygen saturation of 92.0%. The minimum oxygen saturation in NREM was 70.0 % and in REM was 80.0%. The patient spent 37.4 minutes of TST with SaO2 <88%.  Cardiac:  The highest heart rate seen while awake was 97 BPM while the highest heart rate during sleep was 83 BPM with an average sleeping heart rate of 61 BPM.  Limb Movements:  There were a total of 4 PLMs during sleep which resulted in a PLMS index of 0.9. Of these, 16 were associated with arousals which resulted in a PLMS arousal index of 3.7.    Impression:  1.  Severe obstructive sleep apnea with an overall AHI of 86 events an hour  2.  Central apneas were noted with 27% of total events being central apneas  3.  Nocturnal hypoxia with time at or below 88% saturation of 37 minutes likely secondary to uncontrolled sleep apnea  4.  Sleep was fragmented with a sleep efficiency of 57%  5.  Given sleep fragmentation and majority of sleep occurring during second portion of night patient did not meet criteria for split-night protocol    Recommendations:  I recommend the patient return for a CPAP/BiPAP/ASV titration due to the severity of sleep apnea and presence of central apneas.     In some cases alternative treatment options may be proven effective in resolving sleep apnea. These options include upper airway surgery, the use of a dental orthotic, weight loss, or positional therapy. Clinical correlation is  required. In general patients with sleep apnea are advised to avoid alcohol, sedatives and not to operate a motor vehicle while drowsy.  Untreated sleep apnea increases the risk for cardiovascular and neurovascular disease.

## 2025-06-05 ENCOUNTER — OFFICE VISIT (OUTPATIENT)
Dept: SLEEP MEDICINE | Facility: MEDICAL CENTER | Age: 60
End: 2025-06-05
Attending: STUDENT IN AN ORGANIZED HEALTH CARE EDUCATION/TRAINING PROGRAM
Payer: COMMERCIAL

## 2025-06-05 VITALS
HEIGHT: 76 IN | RESPIRATION RATE: 16 BRPM | HEART RATE: 80 BPM | WEIGHT: 305 LBS | DIASTOLIC BLOOD PRESSURE: 84 MMHG | BODY MASS INDEX: 37.14 KG/M2 | OXYGEN SATURATION: 93 % | SYSTOLIC BLOOD PRESSURE: 126 MMHG

## 2025-06-05 DIAGNOSIS — G47.31 CENTRAL SLEEP APNEA: ICD-10-CM

## 2025-06-05 DIAGNOSIS — R06.81 WITNESSED EPISODE OF APNEA: ICD-10-CM

## 2025-06-05 DIAGNOSIS — G47.33 OSA (OBSTRUCTIVE SLEEP APNEA): Primary | ICD-10-CM

## 2025-06-05 PROCEDURE — 99213 OFFICE O/P EST LOW 20 MIN: CPT | Performed by: STUDENT IN AN ORGANIZED HEALTH CARE EDUCATION/TRAINING PROGRAM

## 2025-06-05 PROCEDURE — 3074F SYST BP LT 130 MM HG: CPT | Performed by: STUDENT IN AN ORGANIZED HEALTH CARE EDUCATION/TRAINING PROGRAM

## 2025-06-05 PROCEDURE — 99214 OFFICE O/P EST MOD 30 MIN: CPT | Performed by: STUDENT IN AN ORGANIZED HEALTH CARE EDUCATION/TRAINING PROGRAM

## 2025-06-05 PROCEDURE — 3079F DIAST BP 80-89 MM HG: CPT | Performed by: STUDENT IN AN ORGANIZED HEALTH CARE EDUCATION/TRAINING PROGRAM

## 2025-06-05 ASSESSMENT — FIBROSIS 4 INDEX: FIB4 SCORE: 1.56

## 2025-06-05 NOTE — PROGRESS NOTES
[facility] Follow-up Visit    CC: Follow-up to discuss sleep study results      HPI:  Chris Barr is a 60 y.o.male  with type 2 diabetes mellitus, obesity, and severe obstructive sleep apnea.  Presents today to follow-up regarding management of obstructive sleep apnea discussed the study results.    He reports night of sleep study was not the best night sleep.  He did wake up multiple times throughout the night.  He was surprised to learn that he slept as long as he did.    At last visit it was discussed that he does awaken in his sleep and he does not find his sleep restorative.    Sleep History  5/28/2025 PSG showed severe obstructive sleep apnea with an overall AHI of 86 events an hour, there was an elevated central component of sleep apnea accounting for 27% of total events.    Patient Active Problem List    Diagnosis Date Noted    Type 2 diabetes mellitus without complication, without long-term current use of insulin (HCC) 03/14/2025    Shortness of breath 02/26/2025    Positive depression screening 02/26/2025    Snoring 02/25/2025    Family history of colon cancer 02/25/2025    Encounter to establish care 02/25/2025    Dyslipidemia 03/31/2017    Obesity (BMI 30-39.9) 06/09/2016    Chronic neck pain 06/26/2015    Sinus headache 03/05/2015    Lumbar disc disease 10/23/2009       Past Medical History[1]     Past Surgical History[2]    Family History   Problem Relation Age of Onset    Cancer Mother         Colon at 42    Cancer Father         lymphoma    Alcohol/Drug Father         alcohol    Alzheimer's Disease Father     Cancer Brother         colon    Cancer Maternal Grandmother         colon    Heart Disease Paternal Grandfather        Social History     Socioeconomic History    Marital status:      Spouse name: Not on file    Number of children: 2    Years of education: Not on file    Highest education level: Master's degree (e.g., MA, MS, Isha, MEd, MSW, JEFF)   Occupational History     Occupation: OT     Employer: [employer]   Tobacco Use    Smoking status: Never    Smokeless tobacco: Never   Vaping Use    Vaping status: Never Used   Substance and Sexual Activity    Alcohol use: No    Drug use: No    Sexual activity: Yes     Partners: Female     Birth control/protection: Surgical     Comment: , three children (1 ) wife with colon CA   Other Topics Concern    Not on file   Social History Narrative    Works full time as occupational therapist      Social Drivers of Health     Financial Resource Strain: Low Risk  (2025)    Overall Financial Resource Strain (CARDIA)     Difficulty of Paying Living Expenses: Not hard at all   Food Insecurity: No Food Insecurity (2025)    Hunger Vital Sign     Worried About Running Out of Food in the Last Year: Never true     Ran Out of Food in the Last Year: Never true   Transportation Needs: No Transportation Needs (2025)    PRAPARE - Transportation     Lack of Transportation (Medical): No     Lack of Transportation (Non-Medical): No   Physical Activity: Insufficiently Active (2025)    Exercise Vital Sign     Days of Exercise per Week: 2 days     Minutes of Exercise per Session: 20 min   Stress: No Stress Concern Present (2025)    [employer] - Occupational Stress Questionnaire     Feeling of Stress : Only a little   Social Connections: Moderately Isolated (2025)    Social Connection and Isolation Panel [NHANES]     Frequency of Communication with Friends and Family: Never     Frequency of Social Gatherings with Friends and Family: More than three times a week     Attends Alevism Services: Never     Active Member of Clubs or Organizations: No     Attends Club or Organization Meetings: Never     Marital Status:    Intimate Partner Violence: Not on file   Housing Stability: Low Risk  (2025)    Housing Stability Vital Sign     Unable to Pay for Housing in the Last Year: No     Number of  "Times Moved in the Last Year: 0     Homeless in the Last Year: No       Current Medications[3]     ALLERGIES: Patient has no known allergies.    ROS  Constitutional: Denies fevers, Denies weight changes  Ears/Nose/Throat/Mouth: Denies nasal congestion or sore throat   Cardiovascular: Denies chest pain  Respiratory: Denies shortness of breath, Denies cough  Gastrointestinal/Hepatic: Denies nausea, vomiting  Sleep: see HPI      PHYSICAL EXAM  /84 (BP Location: Left arm, Patient Position: Sitting, BP Cuff Size: Adult)   Pulse 80   Resp 16   Ht 1.93 m (6' 4\")   Wt (!) 138 kg (305 lb)   SpO2 93%   BMI 37.13 kg/m²   Appearance: Well-nourished, well-developed, no acute distress  Eyes:  No scleral icterus , EOMI  Musculoskeletal:  Grossly normal; gait and station normal; digits and nails normal  Skin:  No rashes, petechiae, cyanosis  Neurologic: without focal signs; oriented to person, time, place, and purpose; judgement intact      Medical Decision Making   Assessment and Plan  Chris Barr is a 60 y.o.male  with type 2 diabetes mellitus, obesity, and severe obstructive sleep apnea.  Presents today to follow-up regarding management of obstructive sleep apnea discussed the study results.    The medical record was reviewed.    Obstructive sleep apnea   Reviewed recent PSG with patient showing an AHI of 86,  and Min Oxygen saturation of 70%.  Time at or below 88% saturation 37 minutes  Based on sleep study and symptoms meets criteria for severe  obstructive sleep apnea.     There was a slight elevation in central apneas accounting for 27% of total events.  Discussed differences between obstructive sleep apnea and central sleep apnea.  Given the elevated central respiratory vents recommended patient undergo a PSG titration study to assess if CPAP would control central events or patient may require ASV therapy.    We discussed the pathophysiology of obstructive sleep apnea (FIOR) and risk factors for the disease. We " "also discussed possible consequences of untreated FIOR, including excessive daytime sleepiness and fatigue, cognitive dysfunction, cardiovascular complications such as elevated blood pressure, heart attacks, cardiac arrhythmias, and strokes.     RECOMMENDATIONS  - Order placed for PSG titration study exploring CPAP and BiPAP and ASV  -Will message results on [employer]  -Advised to contact our office or myself with any questions via [employer]  -Follow up after sleep study      Return for after sleep study.      Please note portions of this record was created using voice recognition software. I have made every reasonable attempt to correct obvious errors, but I expect that there are errors of grammar and possibly content I did not discover before finalizing the note.           [1]   Past Medical History:  Diagnosis Date    Anxiety 09/19/2013    Apnea, sleep     Arthritis     spine    Colon polyps 2001, 9/09, 10/12    Tubular Adenoma    Daytime sleepiness     Dyslipidemia 03/31/2017    Gasping for breath     Insomnia     Migraine     Neck pain 06/26/2015    Shortness of breath     Snoring     Spinal stenosis     L2-L3    Type 2 diabetes mellitus without complication, without long-term current use of insulin ([facility]) 03/14/2025   [2]   Past Surgical History:  Procedure Laterality Date    UMBILICAL HERNIA REPAIR  6/28/2017    Procedure: UMBILICAL HERNIA REPAIR WITH MESH;  Surgeon: Juan Campos M.D.;  Location: SURGERY [facility];  Service:     INGUINAL HERNIA REPAIR  9/26/2012    Performed by Juan Campos M.D. at SURGERY [facility]    OTHER NEUROLOGICAL SURG  2011    \"nerve ablation\"    COLONOSCOPY  9/09    VASECTOMY  2004    SHOULDER SURGERY  1984    left   [3]   Current Outpatient Medications   Medication Sig Dispense Refill    metFORMIN ER (GLUCOPHAGE XR) 500 MG TABLET SR 24 HR Take 1 Tablet by mouth every day. 90 Tablet 3     No current facility-administered medications for this visit.     " Patient/Caregiver provided printed discharge information.

## 2025-06-06 ENCOUNTER — SLEEP STUDY (OUTPATIENT)
Dept: SLEEP MEDICINE | Facility: MEDICAL CENTER | Age: 60
End: 2025-06-06
Attending: STUDENT IN AN ORGANIZED HEALTH CARE EDUCATION/TRAINING PROGRAM
Payer: COMMERCIAL

## 2025-06-06 DIAGNOSIS — R06.81 WITNESSED EPISODE OF APNEA: ICD-10-CM

## 2025-06-06 DIAGNOSIS — G47.33 OSA (OBSTRUCTIVE SLEEP APNEA): ICD-10-CM

## 2025-06-06 DIAGNOSIS — G47.31 CENTRAL SLEEP APNEA: ICD-10-CM

## 2025-06-06 PROCEDURE — 95811 POLYSOM 6/>YRS CPAP 4/> PARM: CPT | Performed by: STUDENT IN AN ORGANIZED HEALTH CARE EDUCATION/TRAINING PROGRAM

## 2025-06-09 NOTE — PROCEDURES
Patient: JEN CLINTON  ID: [id number] Date: 6/6/2025   MONTAGE: Standard  STUDY TYPE: Treatment  RECORDING TECHNIQUE:   After the scalp was prepared, gold plated electrodes were applied to the scalp according to the International 10-20 System. EEG (electroencephalogram) was continuously monitored from the O1-M2, O2-M1, C3-M2, C4-M1, F3-M2, and F4-M1. EOGs (electrooculograms) were monitored by electrodes placed at the left and right outer canthi. Chin EMG (electromyogram) was monitored by electrodes placed on the mentalis and sub-mentalis muscles. Nasal and oral airflow were monitored using a triple port thermocouple as well as oronasal pressure transducer. Respiratory effort was measured by inductive plethysmography technology employing abdominal and thoracic belts. Blood oxygen saturation and pulse were monitored by pulse oximetry. Heart rhythm was monitored by surface electrocardiogram. Leg EMG was studied using surface electrodes placed on left and right anterior tibialis. A microphone was used to monitor tracheal sounds and snoring. Body position was monitored and documented by technician observation.   SCORING CRITERIA:   A modification of the AASM manual for scoring of sleep and associated events was used. Obstructive apneas were scored by cessation of airflow for at least 10 seconds with continuing respiratory effort. Central apneas were scored by cessation of airflow for at least 10 seconds with no respiratory effort. Hypopneas were scored by a 30% or more reduction in airflow for at least 10 seconds accompanied by arterial oxygen desaturation of 3% or an arousal. For CMS (Medicare) patients, per AASM rule 1B, hypopneas are scored by 30% with mild reduction in airflow for at least 10 seconds accompanied by arterial saturation decreased at 4%.    Study start time was 10:24:17 PM. Diagnostic recording time was 7h 30.0m with a total sleep time of 5h 49.0m resulting in a sleep efficiency of 77.56%%. Sleep latency  from the start of the study was 08 minutes and the latency from sleep to REM was 53 minutes. In total,75 arousals were scored for an arousal index of 12.9.  Respiratory:  There were a total of 12 apneas consisting of 1 obstructive apneas, 0 mixed apneas, and 11 central apneas. A total of 91 hypopneas were scored. The apnea index was 2.06 per hour and the hypopnea index was 15.64 per hour resulting in an overall AHI of 17.71. AHI during REM was 15.5 and AHI while supine was 0.00.  Oximetry:  There was a mean oxygen saturation of 93.0%. The minimum oxygen saturation in NREM was 85.0 % and in REM was 85.0%. The patient spent 4.7 minutes of TST with SaO2 <88%.  Cardiac:  The highest heart rate seen while awake was 94 BPM while the highest heart rate during sleep was 86 BPM with an average sleeping heart rate of 65 BPM.  Limb Movements:  There were a total of 67 PLMs during sleep which resulted in a PLMS index of 11.5. Of these, 23 were associated with arousals which resulted in a PLMS arousal index of 4.0.  Titration:   CPAP was tried form 5 to 15. BiPAP was tried at 15/11  This was a fully attended sleep study. This test was technically adequate. This patient was titrated on CPAP starting at 5 cm of water pressure. Patient was titrated up to BiPAP 15/11 cm of water pressure. Patient did best at BiPAP 15/11 cm of water pressure. Patient spent 183 minutes at that pressure and the AHI was 5.2 which is considered mild obstructive sleep apnea.     Impression:  1.  Patient used CPAP and BiPAP during night of study  2.  Respiratory events improved most with BiPAP therapy  3.  No supine REM sleep seen while on PAP therapy  4.  No significant nocturnal hypoxia with sleep apnea being controlled    Recommendations:  I recommend auto BiPAP EPAP 11 IPAP 17 pressure support 4 cmH2O.  Patient used Large  Vitera  mask during night of study.    I also recommend 30 day compliance download to assess the efficacy to the recommended  pressure, measure leak, apnea hypopnea index and compliance for further outpatient monitoring and management of PAP therapy. In some cases alternative treatment options may be proven effective in resolving sleep apnea. These options include upper airway surgery, the use of a dental orthotic, weight loss, or positional therapy. Clinical correlation is required. In general patients with sleep apnea are advised to avoid alcohol, sedatives and not to operate a motor vehicle while drowsy.  Untreated sleep apnea increases the risk for cardiovascular and neurovascular disease.

## 2025-06-12 ENCOUNTER — APPOINTMENT (OUTPATIENT)
Dept: MEDICAL GROUP | Facility: CLINIC | Age: 60
End: 2025-06-12
Payer: COMMERCIAL

## 2025-06-12 VITALS
BODY MASS INDEX: 37.14 KG/M2 | SYSTOLIC BLOOD PRESSURE: 128 MMHG | HEART RATE: 78 BPM | DIASTOLIC BLOOD PRESSURE: 80 MMHG | OXYGEN SATURATION: 95 % | HEIGHT: 76 IN | WEIGHT: 305 LBS

## 2025-06-12 DIAGNOSIS — E11.9 TYPE 2 DIABETES MELLITUS WITHOUT COMPLICATION, WITHOUT LONG-TERM CURRENT USE OF INSULIN (HCC): Primary | ICD-10-CM

## 2025-06-12 LAB
HBA1C MFR BLD: 5.6 % (ref ?–5.8)
POCT INT CON NEG: NEGATIVE
POCT INT CON POS: POSITIVE

## 2025-06-12 PROCEDURE — 83036 HEMOGLOBIN GLYCOSYLATED A1C: CPT | Mod: GE

## 2025-06-12 PROCEDURE — 3079F DIAST BP 80-89 MM HG: CPT

## 2025-06-12 PROCEDURE — 3074F SYST BP LT 130 MM HG: CPT

## 2025-06-12 PROCEDURE — 99213 OFFICE O/P EST LOW 20 MIN: CPT | Mod: GE

## 2025-06-12 ASSESSMENT — FIBROSIS 4 INDEX: FIB4 SCORE: 1.56

## 2025-06-12 NOTE — PROGRESS NOTES
"  [facility]    Subjective:     CC: Diabetes follow up    HPI:   Chris is a 60 y.o. male with:    Problem   Type 2 Diabetes Mellitus Without Complication, Without Long-Term Current Use of Insulin (Hcc)    Last Visit:  Patient presents to clinic to follow-up on abnormal A1c  Patient reports that he eats a relatively good diet but likes bread.  Works out weekly  Family history significant for diabetes  Denies any vision changes, shortness of breath, chest pain, abdominal pain, lower extremity edema or lower extremity neuropathy    Today's Visit  Patient presents to clinic for diabetes follow-up  Compliant with metformin 500 mg daily, tolerating well  He is made some lifestyle modifications including incorporating more fruits and vegetables into his diet and reducing simple sugars.  Patient is also trying to get more physical activity         Current Medications and Prescriptions Ordered in Epic[1]    ROS:  Negative except as noted in HPI    Objective:     Exam:  /80 (BP Location: Right arm, Patient Position: Sitting)   Pulse 78   Ht 1.93 m (6' 4\")   Wt (!) 138 kg (305 lb)   SpO2 95%   BMI 37.13 kg/m²  Body mass index is 37.13 kg/m².    Physical Exam  HENT:      Mouth/Throat:      Mouth: Mucous membranes are moist.   Cardiovascular:      Rate and Rhythm: Normal rate and regular rhythm.   Pulmonary:      Effort: No respiratory distress.   Musculoskeletal:      Right lower leg: No edema.      Left lower leg: No edema.   Neurological:      General: No focal deficit present.      Mental Status: He is alert and oriented to person, place, and time.   Psychiatric:         Mood and Affect: Mood normal.         Behavior: Behavior normal.       Labs:     Results for orders placed or performed in visit on 06/12/25   POCT  A1C    Collection Time: 06/12/25  4:10 PM   Result Value Ref Range    Glycohemoglobin 5.6 <=5.8 %    Internal Control Positive Positive     Internal Control Negative Negative      Assessment " & Plan:     60 y.o. male with the following -     Problem List Items Addressed This Visit       Type 2 diabetes mellitus without complication, without long-term current use of insulin (HCC) - Primary    60-year-old male with newly diagnosed type 2 diabetes, currently on metformin 5 mg daily.  Has made lifestyle modifications  Last A1c 6.5, today 5.6    Plan  - Continue metformin 500 mg daily  - Encourage patient to continue making lifestyle modifications eating a diet rich in fruits and vegetables and limiting simple sugars.  Encourage patient to get 150 minutes of moderate exercise weekly.  - Follow-up appointment in 90 days         Relevant Orders    POCT  A1C (Completed)       Return in about 3 months (around 9/12/2025).    I advised the patient that I will contact them with all lab, imaging, or procedure results within a week of having the test performed. If they have not received a message/call from our clinic during that expected time period, they are advised to contact our clinic to ensure that the labs/imaging studies were received by this office.     Yojana Begum MD  [facility]  PGY-3               [1]   Current Outpatient Medications Ordered in Epic   Medication Sig Dispense Refill    metFORMIN ER (GLUCOPHAGE XR) 500 MG TABLET SR 24 HR Take 1 Tablet by mouth every day. 90 Tablet 3     No current Epic-ordered facility-administered medications on file.

## 2025-06-13 NOTE — ASSESSMENT & PLAN NOTE
60-year-old male with newly diagnosed type 2 diabetes, currently on metformin 5 mg daily.  Has made lifestyle modifications  Last A1c 6.5, today 5.6    Plan  - Continue metformin 500 mg daily  - Encourage patient to continue making lifestyle modifications eating a diet rich in fruits and vegetables and limiting simple sugars.  Encourage patient to get 150 minutes of moderate exercise weekly.  - Follow-up appointment in 90 days

## 2025-06-16 ENCOUNTER — OFFICE VISIT (OUTPATIENT)
Dept: SLEEP MEDICINE | Facility: MEDICAL CENTER | Age: 60
End: 2025-06-16
Attending: NURSE PRACTITIONER
Payer: COMMERCIAL

## 2025-06-16 VITALS
WEIGHT: 305 LBS | SYSTOLIC BLOOD PRESSURE: 114 MMHG | OXYGEN SATURATION: 97 % | HEART RATE: 88 BPM | DIASTOLIC BLOOD PRESSURE: 80 MMHG | RESPIRATION RATE: 16 BRPM | HEIGHT: 76 IN | BODY MASS INDEX: 37.14 KG/M2

## 2025-06-16 DIAGNOSIS — G47.33 OSA (OBSTRUCTIVE SLEEP APNEA): Primary | ICD-10-CM

## 2025-06-16 DIAGNOSIS — G47.31 CENTRAL SLEEP APNEA: ICD-10-CM

## 2025-06-16 PROCEDURE — 3074F SYST BP LT 130 MM HG: CPT | Performed by: NURSE PRACTITIONER

## 2025-06-16 PROCEDURE — 99214 OFFICE O/P EST MOD 30 MIN: CPT | Performed by: NURSE PRACTITIONER

## 2025-06-16 PROCEDURE — 3079F DIAST BP 80-89 MM HG: CPT | Performed by: NURSE PRACTITIONER

## 2025-06-16 ASSESSMENT — FIBROSIS 4 INDEX: FIB4 SCORE: 1.56

## 2025-06-16 NOTE — PROGRESS NOTES
Chief Complaint   Patient presents with    Follow-Up     SLEEP - RESULTS - CHART PREP COMPLETED ON 06/06/2025    Last Office Visit 06/05/2025 with Dr. Landeros    Study Complete on 06/06/2025        HPI:  Chris Barr is a 60 y.o. year old male here today for follow-up on titration study results.  Last seen 6/5/2025 for PSG results. He states for years he was told that he snores in his sleep and has interruptions to his breathing.  His wife notices him at night due to his pauses in breathing.  He was discussing this with his PCP and is recommended to be evaluated by sleep medicine for potential sleep apnea.     He generally has no trouble falling asleep.  He does awaken 3-4 times a night and sometimes has difficulty getting back to sleep.  His sleep is nonrestorative.  During the day he can have tiredness especially in the afternoon.    PSG (5/28/2025):  1.  Severe obstructive sleep apnea with an overall AHI of 86 events an hour  2.  Central apneas were noted with 27% of total events being central apneas  3.  Nocturnal hypoxia with time at or below 88% saturation of 37 minutes likely secondary to uncontrolled sleep apnea  4.  Sleep was fragmented with a sleep efficiency of 57%  5.  Given sleep fragmentation and majority of sleep occurring during second portion of night patient did not meet criteria for split-night protocol     Recommendations:  I recommend the patient return for a CPAP/BiPAP/ASV titration due to the severity of sleep apnea and presence of central apneas.     Titration PSG (6/6/2025):  1.  Patient used CPAP and BiPAP during night of study  2.  Respiratory events improved most with BiPAP therapy  3.  No supine REM sleep seen while on PAP therapy  4.  No significant nocturnal hypoxia with sleep apnea being controlled     Recommendations:  I recommend auto BiPAP EPAP 11 IPAP 17 pressure support 4 cmH2O.  Patient used Large  Vitera  mask during night of study.    ROS: As per HPI and otherwise negative if  "not stated.    Past Medical History[1]    Past Surgical History[2]    Family History   Problem Relation Age of Onset    Cancer Mother         Colon at 42    Cancer Father         lymphoma    Alcohol/Drug Father         alcohol    Alzheimer's Disease Father     Cancer Brother         colon    Cancer Maternal Grandmother         colon    Heart Disease Paternal Grandfather        Allergies as of 06/16/2025    (No Known Allergies)        Vitals:  /80 (BP Location: Left arm, Patient Position: Sitting, BP Cuff Size: Adult)   Pulse 88   Resp 16   Ht 1.93 m (6' 4\")   Wt (!) 138 kg (305 lb)   SpO2 97%     Current medications as of today Current Medications[3]      Physical Exam:   Gen:           Alert and oriented, No apparent distress. Mood and affect appropriate, normal interaction with examiner.  Eyes:          PERRL, EOM intact, sclere white, conjunctive moist.  Ears:          Not examined.   Hearing:     Grossly intact.  Nose:          Normal, no lesions or deformities.  Dentition:    Good dentition.  Oropharynx:   Tongue normal, posterior pharynx without erythema or exudate.  Neck:        Supple, trachea midline, no masses.  Respiratory Effort: No intercostal retractions or use of accessory muscles.   Lung Auscultation:      Clear to auscultation bilaterally; no rales, rhonchi or wheezing.  CV:            Regular rate and rhythm. No murmurs, rubs or gallops.  Abd:           Not examined.   Lymphadenopathy: Not examined.  Gait and Station: Normal.  Digits and Nails: No clubbing, cyanosis, petechiae, or nodes.   Cranial Nerves: II-XII grossly intact.  Skin:        No rashes, lesions or ulcers noted.               Ext:           No cyanosis or edema.      Assessment:  1. FIOR (obstructive sleep apnea)  DME BiPAP      2. Central sleep apnea  DME BiPAP        Plan:   I reviewed with the patient the pathophysiology of obstructive sleep apnea, as well as potential cardiac and neurologic risks associated with " "untreated sleep apnea including CAD, HTN, pulmonary arterial hypertension, cardiac arrhythmias, heart attack or stroke.  FIOR patient's have increased risk of motor vehicle accidents, DM type II, chronic kidney disease and nonalcoholic liver disease.  He is cautioned against driving while sleepy for his safety and safety of others on the road. We reviewed treatment modalities for sleep apnea including CPAP/BiPAP therapy, ENT referral, dental appliance.      Presents for titration study results.  Known history of severe FIOR with an AHI of 86 with some element of central sleep apnea.  Patient underwent titration and seems to do best on BiPAP.  Recommendation for auto BiPAP.  Order placed.  Patient advised to follow-up within 90 days on new device.    DME : [facility]    Once you receive your new PAP machine from the [employer] company you're referred to, we must see you back for an office visit between 30-90 days of you using the machine to review compliance.  If you were ordered an ASV machine, we need to see you in office no sooner than your 60th day on therapy.     This is a very important time frame for insurance purposes. If you do not follow up with our office for compliance your insurance may not continue to pay for the machine. Also if you do not use the machine for at least 4 hours each night, you may be deemed \"Incompliant\", in which case the insurance may also not continue to pay for the machine.    If you are incompliant, you may have to surrender your machine to the DME company and start the process over if you wish to continue therapy after that. Meaning a new office consult and new sleep studies.    For your first visit back with our office, please bring the whole machine with the power cord. We will download the compliance off the SD card in the machine, and are able to make changes if need be in office. Some machines have a modem and we can access the data wirelessly, if this is the case " "please make sure the [employer] company grants us access to your machine.  For all follow up appointments after that, you will only need to bring the SD card to the appointment.    If you are having any issues with the mask, you have a 30 day window to exchange and try something else with your DME company.  If you are having issues with the pressure, please call our office at [phone].  These issues can cause you to not be able to use machine appropriately, there for make you incompliant.    Please call our office if you have any questions.    Thank you, [facility].  [phone]      Please note that this dictation was created using voice recognition software. I have made every reasonable attempt to correct obvious errors, but it is possible there are errors of grammar and possibly content that I did not discover before finalizing the note.         [1]   Past Medical History:  Diagnosis Date    Anxiety 09/19/2013    Apnea, sleep     Arthritis     spine    Colon polyps 2001, 9/09, 10/12    Tubular Adenoma    Daytime sleepiness     Dyslipidemia 03/31/2017    Gasping for breath     Insomnia     Migraine     Neck pain 06/26/2015    Shortness of breath     Snoring     Spinal stenosis     L2-L3    Type 2 diabetes mellitus without complication, without long-term current use of insulin ([facility]) 03/14/2025   [2]   Past Surgical History:  Procedure Laterality Date    UMBILICAL HERNIA REPAIR  6/28/2017    Procedure: UMBILICAL HERNIA REPAIR WITH MESH;  Surgeon: Juan Campos M.D.;  Location: [facility];  Service:     INGUINAL HERNIA REPAIR  9/26/2012    Performed by Juan Campos M.D. at [facility]    OTHER NEUROLOGICAL SURG  2011    \"nerve ablation\"    COLONOSCOPY  9/09    VASECTOMY  2004    SHOULDER SURGERY  1984    left   [3]   Current Outpatient Medications   Medication Sig Dispense Refill    metFORMIN ER (GLUCOPHAGE XR) 500 MG TABLET SR 24 HR Take 1 Tablet by mouth every day. 90 " Tablet 3     No current facility-administered medications for this visit.

## 2025-08-25 ENCOUNTER — OFFICE VISIT (OUTPATIENT)
Dept: MEDICAL GROUP | Facility: CLINIC | Age: 60
End: 2025-08-25
Payer: COMMERCIAL

## 2025-08-25 VITALS
HEIGHT: 76 IN | DIASTOLIC BLOOD PRESSURE: 98 MMHG | OXYGEN SATURATION: 95 % | HEART RATE: 92 BPM | SYSTOLIC BLOOD PRESSURE: 152 MMHG | WEIGHT: 304 LBS | TEMPERATURE: 98 F | BODY MASS INDEX: 37.02 KG/M2

## 2025-08-25 DIAGNOSIS — E78.5 DYSLIPIDEMIA: ICD-10-CM

## 2025-08-25 DIAGNOSIS — M54.2 CHRONIC NECK PAIN: Primary | ICD-10-CM

## 2025-08-25 DIAGNOSIS — G89.29 CHRONIC NECK PAIN: Primary | ICD-10-CM

## 2025-08-25 DIAGNOSIS — E11.9 TYPE 2 DIABETES MELLITUS WITHOUT COMPLICATION, WITHOUT LONG-TERM CURRENT USE OF INSULIN (HCC): ICD-10-CM

## 2025-08-25 PROCEDURE — 3080F DIAST BP >= 90 MM HG: CPT | Mod: GC

## 2025-08-25 PROCEDURE — 3077F SYST BP >= 140 MM HG: CPT | Mod: GC

## 2025-08-25 PROCEDURE — 99214 OFFICE O/P EST MOD 30 MIN: CPT | Mod: GC

## 2025-08-25 ASSESSMENT — FIBROSIS 4 INDEX: FIB4 SCORE: 1.56

## (undated) DEVICE — SUTURE 3-0 VICRYL PLUS SH - 8X 18 INCH (12/BX)

## (undated) DEVICE — DRESSING TRANSPARENT FILM TEGADERM 4 X 4.75" (50EA/BX)"

## (undated) DEVICE — CHLORAPREP 26 ML APPLICATOR - ORANGE TINT(25/CA)

## (undated) DEVICE — DRAPE LAPAROTOMY T SHEET - (12EA/CA)

## (undated) DEVICE — MASK ANESTHESIA ADULT  - (100/CA)

## (undated) DEVICE — KIT ROOM DECONTAMINATION

## (undated) DEVICE — SUCTION INSTRUMENT YANKAUER BULBOUS TIP W/O VENT (50EA/CA)

## (undated) DEVICE — SET LEADWIRE 5 LEAD BEDSIDE DISPOSABLE ECG (1SET OF 5/EA)

## (undated) DEVICE — STERI STRIP COMPOUND BENZOIN - TINCTURE 0.6ML WITH APPLICATOR (40EA/BX)

## (undated) DEVICE — CANISTER SUCTION 3000ML MECHANICAL FILTER AUTO SHUTOFF MEDI-VAC NONSTERILE LF DISP  (40EA/CA)

## (undated) DEVICE — SPONGE GAUZESTER. 2X2 4-PL - (2/PK 50PK/BX 30BX/CS)

## (undated) DEVICE — SUTURE GENERAL

## (undated) DEVICE — PACK MINOR BASIN - (2EA/CA)

## (undated) DEVICE — HEAD HOLDER JUNIOR/ADULT

## (undated) DEVICE — GLOVE BIOGEL PI ORTHO SZ 7 PF LF (40PR/BX)

## (undated) DEVICE — SLEEVE, VASO, THIGH, MED

## (undated) DEVICE — CLOSURE SKIN STRIP 1/2 X 4 IN - (STERI STRIP) (50/BX 4BX/CA)

## (undated) DEVICE — ELECTRODE DUAL RETURN W/ CORD - (50/PK)

## (undated) DEVICE — BLADE SURGICAL CLIPPER - (50EA/CA)

## (undated) DEVICE — SUTURE 0 VICRYL PLUS CT-2 - 27 INCH (36/BX)

## (undated) DEVICE — SUTURE 4-0 VICRYL PLUS FS-2 - 27 INCH (36/BX)

## (undated) DEVICE — GLOVE BIOGEL SZ 7.5 SURGICAL PF LTX - (50PR/BX 4BX/CA)

## (undated) DEVICE — SODIUM CHL IRRIGATION 0.9% 1000ML (12EA/CA)

## (undated) DEVICE — NEEDLE NON SAFETY 25 GA X 1 1/2 IN HYPO (100EA/BX)

## (undated) DEVICE — BLADE SURGICAL #15 - (50/BX 3BX/CA)

## (undated) DEVICE — NEPTUNE 4 PORT MANIFOLD - (20/PK)

## (undated) DEVICE — SET EXTENSION WITH 2 PORTS (48EA/CA) ***PART #2C8610 IS A SUBSTITUTE*****

## (undated) DEVICE — GOWN WARMING STANDARD FLEX - (30/CA)

## (undated) DEVICE — SUTURE 0 ETHIBOND MO6 C/R - (12/BX) 8-18 INCH ETHICON

## (undated) DEVICE — ELECTRODE 850 FOAM ADHESIVE - HYDROGEL RADIOTRNSPRNT (50/PK)

## (undated) DEVICE — TUBING CLEARLINK DUO-VENT - C-FLO (48EA/CA)

## (undated) DEVICE — SENSOR SPO2 NEO LNCS ADHESIVE (20/BX) SEE USER NOTES

## (undated) DEVICE — PROTECTOR ULNA NERVE - (36PR/CA)

## (undated) DEVICE — LACTATED RINGERS INJ 1000 ML - (14EA/CA 60CA/PF)

## (undated) DEVICE — SYRINGE 10 ML CONTROL LL (25EA/BX 4BX/CA)

## (undated) DEVICE — MASK, LARYNGEAL AIRWAY #4

## (undated) DEVICE — KIT ANESTHESIA W/CIRCUIT & 3/LT BAG W/FILTER (20EA/CA)